# Patient Record
Sex: MALE | Race: WHITE | NOT HISPANIC OR LATINO | Employment: OTHER | URBAN - METROPOLITAN AREA
[De-identification: names, ages, dates, MRNs, and addresses within clinical notes are randomized per-mention and may not be internally consistent; named-entity substitution may affect disease eponyms.]

---

## 2017-03-01 DIAGNOSIS — Z12.11 ENCOUNTER FOR SCREENING COLONOSCOPY: Primary | ICD-10-CM

## 2017-07-06 RX ORDER — FINASTERIDE 5 MG/1
5 TABLET, FILM COATED ORAL DAILY
Qty: 90 TABLET | Refills: 1 | Status: SHIPPED | OUTPATIENT
Start: 2017-07-06 | End: 2017-11-27 | Stop reason: SDUPTHER

## 2017-07-31 ENCOUNTER — TELEPHONE (OUTPATIENT)
Dept: FAMILY MEDICINE CLINIC | Facility: CLINIC | Age: 72
End: 2017-07-31

## 2017-07-31 NOTE — TELEPHONE ENCOUNTER
----- Message from Dinah Pickard sent at 7/26/2017  8:22 AM EDT -----  PT KNOWS YOU ARE OUT OF OFFICE UNTIL Monday, JUST ASKED THAT I LET YOU KNOW IN THE MORNINGS HE CHECKS HIS BLOOD PRESSURE.  ITS USUALLY AROUND 91/51 WHEN HE FIRST GETS UP.  ABOUT 10 MINUTES LATER IT HAS RISEN /75.  HE WANTS TO KNOW IF THIS IS SOMETHING HE SHOULD COME IN TO DISCUSS WITH YOU.    PT'S NUMBER [phone]  A message was left that as long as he is not dizzy to 91/50 one does not bother me.  It could be coming from taking his tamsulosin at night.  Not sure I would make any adjustments in his medication but we may need to check his lab work if it persists.

## 2017-08-16 ENCOUNTER — OFFICE VISIT (OUTPATIENT)
Dept: FAMILY MEDICINE CLINIC | Facility: CLINIC | Age: 72
End: 2017-08-16

## 2017-08-16 VITALS
HEIGHT: 71 IN | TEMPERATURE: 98.5 F | HEART RATE: 75 BPM | WEIGHT: 232 LBS | OXYGEN SATURATION: 98 % | DIASTOLIC BLOOD PRESSURE: 76 MMHG | RESPIRATION RATE: 16 BRPM | BODY MASS INDEX: 32.48 KG/M2 | SYSTOLIC BLOOD PRESSURE: 128 MMHG

## 2017-08-16 DIAGNOSIS — E78.5 HYPERLIPIDEMIA, UNSPECIFIED HYPERLIPIDEMIA TYPE: ICD-10-CM

## 2017-08-16 DIAGNOSIS — N42.9 DISORDER OF PROSTATE: ICD-10-CM

## 2017-08-16 DIAGNOSIS — R61 NIGHT SWEATS: Primary | ICD-10-CM

## 2017-08-16 DIAGNOSIS — I10 ESSENTIAL HYPERTENSION: ICD-10-CM

## 2017-08-16 PROCEDURE — 99214 OFFICE O/P EST MOD 30 MIN: CPT | Performed by: INTERNAL MEDICINE

## 2017-08-16 NOTE — PROGRESS NOTES
Subjective chief complaint is night sweats  Brian Martines is a 72 y.o. male.     History of Present Illness   Brian is here today for complaints of night sweats.  These are not occurring every night.  Is been going on for approximately one half of the year.  He has no known exposure to tuberculosis and no past history of tuberculosis.  He does take tamsulosin and Proscar for benign prostatic hypertrophy.  He takes the Flomax at night.  Additionally he has hypertension.  He is on some triamterene/hydrochlorothiazide.  He is not having any significant unexplained weight loss.  He has been trying to diet and exercise more regularly.  His weight has decreased by 8 pounds since his last visit here.  He has not had a recent chest x-ray did have a CAT scan of the chest in 2014 that showed no evidence of adenopathy or abnormalities.  He did have a colonoscopy this year.  The following portions of the patient's history were reviewed and updated as appropriate: allergies, current medications, past medical history and problem list.    Review of Systems   Constitutional: Positive for activity change. Negative for appetite change, chills and fever.   Respiratory: Negative for chest tightness and shortness of breath.    Cardiovascular: Negative for chest pain.   Genitourinary: Negative for dysuria and hematuria.       Objective   Physical Exam   Eyes: No scleral icterus.   Neck: No thyromegaly present.   Cardiovascular: Normal rate, regular rhythm and normal heart sounds.  Exam reveals no gallop and no friction rub.    No murmur heard.  Pulmonary/Chest: Effort normal and breath sounds normal. No respiratory distress. He has no wheezes. He has no rales.   Abdominal: Soft. Bowel sounds are normal. He exhibits no distension. There is no tenderness. There is no rebound and no guarding.   Musculoskeletal: He exhibits no edema.   Lymphadenopathy:        Head (right side): No submental, no submandibular, no preauricular and no  occipital adenopathy present.        Head (left side): No submental, no submandibular, no preauricular and no occipital adenopathy present.     He has no cervical adenopathy.     He has no axillary adenopathy.        Right: No supraclavicular adenopathy present.        Left: No supraclavicular adenopathy present.   Nursing note and vitals reviewed.      Assessment/Plan   Brian was seen today for hypertension and night sweats.    Diagnoses and all orders for this visit:    Night sweats  -     CBC & Differential  -     Comprehensive Metabolic Panel  -     TSH+Free T4  -     QuantiFERON TB Gold  -     XR Chest PA & Lateral; Future  -     PSA    Essential hypertension  -     CBC & Differential  -     Comprehensive Metabolic Panel  -     TSH+Free T4    Hyperlipidemia, unspecified hyperlipidemia type  -     Lipid Panel    Disorder of prostate   -     PSA      Brian is here today for valuation of night sweats.  His exam is unremarkable.  I am going to check some lab work including a Quantiferon gold.  I suspect however that his night sweats are coming from his tamsulosin causing vasodilatation.

## 2017-08-19 LAB
ALBUMIN SERPL-MCNC: 4.5 G/DL (ref 3.5–4.8)
ALBUMIN/GLOB SERPL: 1.7 {RATIO} (ref 1.2–2.2)
ALP SERPL-CCNC: 63 IU/L (ref 39–117)
ALT SERPL-CCNC: 26 IU/L (ref 0–44)
ANNOTATION COMMENT IMP: NORMAL
AST SERPL-CCNC: 28 IU/L (ref 0–40)
BASOPHILS # BLD AUTO: 0 X10E3/UL (ref 0–0.2)
BASOPHILS NFR BLD AUTO: 0 %
BILIRUB SERPL-MCNC: 1.7 MG/DL (ref 0–1.2)
BUN SERPL-MCNC: 21 MG/DL (ref 8–27)
BUN/CREAT SERPL: 17 (ref 10–24)
CALCIUM SERPL-MCNC: 9.4 MG/DL (ref 8.6–10.2)
CHLORIDE SERPL-SCNC: 95 MMOL/L (ref 96–106)
CHOLEST SERPL-MCNC: 138 MG/DL (ref 100–199)
CO2 SERPL-SCNC: 24 MMOL/L (ref 18–29)
CREAT SERPL-MCNC: 1.25 MG/DL (ref 0.76–1.27)
EOSINOPHIL # BLD AUTO: 0.2 X10E3/UL (ref 0–0.4)
EOSINOPHIL NFR BLD AUTO: 2 %
ERYTHROCYTE [DISTWIDTH] IN BLOOD BY AUTOMATED COUNT: 13.7 % (ref 12.3–15.4)
GAMMA INTERFERON BACKGROUND BLD IA-ACNC: 0.03 IU/ML
GLOBULIN SER CALC-MCNC: 2.7 G/DL (ref 1.5–4.5)
GLUCOSE SERPL-MCNC: 93 MG/DL (ref 65–99)
HCT VFR BLD AUTO: 49.1 % (ref 37.5–51)
HDLC SERPL-MCNC: 39 MG/DL
HGB BLD-MCNC: 16.6 G/DL (ref 12.6–17.7)
IMM GRANULOCYTES # BLD: 0 X10E3/UL (ref 0–0.1)
IMM GRANULOCYTES NFR BLD: 0 %
INTERPRETATION: NORMAL
LDLC SERPL CALC-MCNC: 63 MG/DL (ref 0–99)
LYMPHOCYTES # BLD AUTO: 2.3 X10E3/UL (ref 0.7–3.1)
LYMPHOCYTES NFR BLD AUTO: 24 %
M TB IFN-G BLD-IMP: NEGATIVE
M TB IFN-G CD4+ BCKGRND COR BLD-ACNC: 0 IU/ML
M TB IFN-G CD4+ T-CELLS BLD-ACNC: 0.03 IU/ML
MCH RBC QN AUTO: 30.7 PG (ref 26.6–33)
MCHC RBC AUTO-ENTMCNC: 33.8 G/DL (ref 31.5–35.7)
MCV RBC AUTO: 91 FL (ref 79–97)
MITOGEN IGNF BLD-ACNC: >10 IU/ML
MONOCYTES # BLD AUTO: 0.9 X10E3/UL (ref 0.1–0.9)
MONOCYTES NFR BLD AUTO: 9 %
NEUTROPHILS # BLD AUTO: 6.1 X10E3/UL (ref 1.4–7)
NEUTROPHILS NFR BLD AUTO: 65 %
PLATELET # BLD AUTO: 205 X10E3/UL (ref 150–379)
POTASSIUM SERPL-SCNC: 3.4 MMOL/L (ref 3.5–5.2)
PROT SERPL-MCNC: 7.2 G/DL (ref 6–8.5)
PSA SERPL-MCNC: 0.2 NG/ML (ref 0–4)
QUANTIFERON INCUBATION: NORMAL
RBC # BLD AUTO: 5.4 X10E6/UL (ref 4.14–5.8)
SERVICE CMNT-IMP: NORMAL
SODIUM SERPL-SCNC: 140 MMOL/L (ref 134–144)
T4 FREE SERPL-MCNC: 1.15 NG/DL (ref 0.82–1.77)
TRIGL SERPL-MCNC: 181 MG/DL (ref 0–149)
TSH SERPL DL<=0.005 MIU/L-ACNC: 1.57 UIU/ML (ref 0.45–4.5)
VLDLC SERPL CALC-MCNC: 36 MG/DL (ref 5–40)
WBC # BLD AUTO: 9.5 X10E3/UL (ref 3.4–10.8)

## 2017-11-27 RX ORDER — FINASTERIDE 5 MG/1
TABLET, FILM COATED ORAL
Qty: 90 TABLET | Refills: 0 | Status: SHIPPED | OUTPATIENT
Start: 2017-11-27 | End: 2022-01-20

## 2018-05-25 ENCOUNTER — OFFICE VISIT (OUTPATIENT)
Dept: FAMILY MEDICINE CLINIC | Facility: CLINIC | Age: 73
End: 2018-05-25

## 2018-05-25 VITALS
BODY MASS INDEX: 33.74 KG/M2 | SYSTOLIC BLOOD PRESSURE: 140 MMHG | DIASTOLIC BLOOD PRESSURE: 86 MMHG | OXYGEN SATURATION: 99 % | WEIGHT: 241 LBS | HEIGHT: 71 IN | HEART RATE: 84 BPM | TEMPERATURE: 98.6 F

## 2018-05-25 DIAGNOSIS — I10 ESSENTIAL HYPERTENSION: ICD-10-CM

## 2018-05-25 DIAGNOSIS — E78.5 HYPERLIPIDEMIA, UNSPECIFIED HYPERLIPIDEMIA TYPE: ICD-10-CM

## 2018-05-25 DIAGNOSIS — M62.838 CERVICAL PARASPINAL MUSCLE SPASM: ICD-10-CM

## 2018-05-25 DIAGNOSIS — M54.50 ACUTE MIDLINE LOW BACK PAIN WITHOUT SCIATICA: Primary | ICD-10-CM

## 2018-05-25 PROCEDURE — 99214 OFFICE O/P EST MOD 30 MIN: CPT | Performed by: INTERNAL MEDICINE

## 2018-05-25 NOTE — PROGRESS NOTES
Subjective  Chief complaint is back pain  Brian Martines is a 73 y.o. male.     History of Present Illness   Brian is here today for evaluation of back pain.  He fell 3 days ago.  This was a fall off a chair.  He landed flush on his buttocks.  He felt pain in his lumbar region extending all way up to the neck.  Over the next several days he experienced some stiffening and pain.  He was seen at her physical therapy office yesterday.  He paid out of pocket for evaluation and did get some relief from physical therapy treatments.  The pain now is a 3 on a scale of 10.  There is no associated numbness or tingling in the legs.  His neck is somewhat stiff and sore as well.  Past medical history is remarkable for hypertension and hyperlipidemia.  He also has some benign prostatic hypertrophy.  He is due for some lab work to check on his cholesterol.  The following portions of the patient's history were reviewed and updated as appropriate: allergies, current medications, past medical history and problem list.    Review of Systems   Constitutional: Negative for chills and fever.   Genitourinary: Negative for hematuria.   Musculoskeletal: Positive for back pain and neck stiffness.   Skin: Negative for rash.   Neurological: Negative for weakness and numbness.       Objective   Physical Exam   Constitutional: He appears well-developed and well-nourished.   Cardiovascular: Normal rate.    Pulmonary/Chest: Effort normal.   Musculoskeletal:   He has limited lateral rotation of the neck approximately 60° in each direction.  He has limited extension of the neck.  He does not exhibit any focal spinal tenderness.  There is some paraspinous muscle tenderness primarily on the right hand side.  There is no sacroiliac tenderness.   Nursing note and vitals reviewed.        Assessment/Plan   Brian was seen today for back pain and fall.    Diagnoses and all orders for this visit:    Acute midline low back pain without sciatica  -      Ambulatory Referral to Physical Therapy Evaluate and treat, Ortho; Heat, Electrotherapy; Tens (Home); Stretching, Strengthening    Cervical paraspinal muscle spasm  -     Ambulatory Referral to Physical Therapy Evaluate and treat, Ortho; Heat, Electrotherapy; Tens (Home); Stretching, Strengthening    Hyperlipidemia, unspecified hyperlipidemia type  -     Comprehensive Metabolic Panel; Future  -     Lipid Panel; Future    Essential hypertension  -     Comprehensive Metabolic Panel; Future  -     Lipid Panel; Future      Outward is here today for evaluation of back pain.  I believe this is going to be mechanical back pain.  I do not suspect he has a compression fracture.  I am going to allow him to continue in some physical therapy for both this and his neck.  We are going to set him up for some fasting laboratories look his cholesterol.  If his back pain does not improve we will get x-rays at that time.

## 2018-05-30 ENCOUNTER — RESULTS ENCOUNTER (OUTPATIENT)
Dept: FAMILY MEDICINE CLINIC | Facility: CLINIC | Age: 73
End: 2018-05-30

## 2018-05-30 DIAGNOSIS — I10 ESSENTIAL HYPERTENSION: ICD-10-CM

## 2018-05-30 DIAGNOSIS — E78.5 HYPERLIPIDEMIA, UNSPECIFIED HYPERLIPIDEMIA TYPE: ICD-10-CM

## 2018-05-30 LAB
ALBUMIN SERPL-MCNC: 4.7 G/DL (ref 3.5–5.2)
ALBUMIN/GLOB SERPL: 1.7 G/DL
ALP SERPL-CCNC: 57 U/L (ref 39–117)
ALT SERPL-CCNC: 32 U/L (ref 1–41)
AST SERPL-CCNC: 31 U/L (ref 1–40)
BILIRUB SERPL-MCNC: 1.3 MG/DL (ref 0.1–1.2)
BUN SERPL-MCNC: 21 MG/DL (ref 8–23)
BUN/CREAT SERPL: 17.6 (ref 7–25)
CALCIUM SERPL-MCNC: 9.8 MG/DL (ref 8.6–10.5)
CHLORIDE SERPL-SCNC: 98 MMOL/L (ref 98–107)
CHOLEST SERPL-MCNC: 144 MG/DL (ref 0–200)
CO2 SERPL-SCNC: 27.1 MMOL/L (ref 22–29)
CREAT SERPL-MCNC: 1.19 MG/DL (ref 0.76–1.27)
GFR SERPLBLD CREATININE-BSD FMLA CKD-EPI: 60 ML/MIN/1.73
GFR SERPLBLD CREATININE-BSD FMLA CKD-EPI: 72 ML/MIN/1.73
GLOBULIN SER CALC-MCNC: 2.8 GM/DL
GLUCOSE SERPL-MCNC: 95 MG/DL (ref 65–99)
HDLC SERPL-MCNC: 39 MG/DL (ref 40–60)
LDLC SERPL CALC-MCNC: 73 MG/DL (ref 0–100)
POTASSIUM SERPL-SCNC: 3.6 MMOL/L (ref 3.5–5.2)
PROT SERPL-MCNC: 7.5 G/DL (ref 6–8.5)
SODIUM SERPL-SCNC: 142 MMOL/L (ref 136–145)
TRIGL SERPL-MCNC: 162 MG/DL (ref 0–150)
VLDLC SERPL CALC-MCNC: 32.4 MG/DL (ref 5–40)

## 2018-10-16 ENCOUNTER — FLU SHOT (OUTPATIENT)
Dept: FAMILY MEDICINE CLINIC | Facility: CLINIC | Age: 73
End: 2018-10-16

## 2018-10-16 ENCOUNTER — TELEPHONE (OUTPATIENT)
Dept: FAMILY MEDICINE CLINIC | Facility: CLINIC | Age: 73
End: 2018-10-16

## 2018-10-16 DIAGNOSIS — Z23 NEED FOR INFLUENZA VACCINATION: ICD-10-CM

## 2018-10-16 PROCEDURE — G0008 ADMIN INFLUENZA VIRUS VAC: HCPCS | Performed by: INTERNAL MEDICINE

## 2018-10-16 PROCEDURE — 90662 IIV NO PRSV INCREASED AG IM: CPT | Performed by: INTERNAL MEDICINE

## 2018-10-16 NOTE — TELEPHONE ENCOUNTER
Patient came inn for High dose flu and requested to check his BP. Rt arm 172/90.    Please call patient to discuss his BP.    After flu shot administered he asked I take his BP again. Rt arm 158/90.    Cell #[phone].  Thank you.    I talked with the patient regarding his blood pressure.  I did advise him to set up an office visit.  He did recently have a heart catheterization that was normal.

## 2018-10-19 ENCOUNTER — OFFICE VISIT (OUTPATIENT)
Dept: FAMILY MEDICINE CLINIC | Facility: CLINIC | Age: 73
End: 2018-10-19

## 2018-10-19 VITALS
BODY MASS INDEX: 33.46 KG/M2 | HEIGHT: 71 IN | SYSTOLIC BLOOD PRESSURE: 156 MMHG | DIASTOLIC BLOOD PRESSURE: 80 MMHG | TEMPERATURE: 98 F | HEART RATE: 68 BPM | RESPIRATION RATE: 16 BRPM | WEIGHT: 239 LBS

## 2018-10-19 DIAGNOSIS — I10 ESSENTIAL HYPERTENSION: ICD-10-CM

## 2018-10-19 DIAGNOSIS — I20.9 ANGINA PECTORIS WITHOUT MYOCARDIAL INFARCTION (HCC): Primary | ICD-10-CM

## 2018-10-19 PROCEDURE — 99213 OFFICE O/P EST LOW 20 MIN: CPT | Performed by: INTERNAL MEDICINE

## 2018-10-19 RX ORDER — ISOSORBIDE MONONITRATE 30 MG/1
30 TABLET, EXTENDED RELEASE ORAL DAILY
Qty: 30 TABLET | Refills: 5 | Status: SHIPPED | OUTPATIENT
Start: 2018-10-19 | End: 2018-11-01

## 2018-10-19 NOTE — PROGRESS NOTES
Subjective complaint is blood pressure also neck discomfort  Brian Martines is a 73 y.o. male.     History of Present Illness   Brian is here today for his blood pressure.  His blood pressure has been a little bit elevated when he has been taking it recently.  He previously was on some triamterene/hydrochlorothiazide.  He does note that since April of this year he has been getting some him for or pressure sensation in his shoulders and arms when he first starts walking.  Sometimes he gets some pressure sensation in the neck.  This usually occurs after 3-4 minutes into the walk but does get better after a period of time.  He recently saw his cardiologist.  He did have a heart catheterization.  He does have 3 vessel disease but it is not adequate critical point.  His previous stent in the LAD did have some restenosis but it was not critical.  The following portions of the patient's history were reviewed and updated as appropriate: allergies, current medications, past medical history and problem list.    Review of Systems   Respiratory: Negative for chest tightness and shortness of breath.    Cardiovascular: Negative for chest pain.   Neurological: Negative for dizziness, light-headedness and headache.       Objective   Physical Exam   Constitutional: He appears well-developed and well-nourished.   Cardiovascular: Normal rate, regular rhythm and normal heart sounds.    Pulmonary/Chest: Effort normal and breath sounds normal. No respiratory distress. He has no wheezes. He has no rales.   Musculoskeletal: He exhibits no edema.   Nursing note and vitals reviewed.        Assessment/Plan   Brian was seen today for hypertension.    Diagnoses and all orders for this visit:    Angina pectoris without myocardial infarction (CMS/HCC)    Essential hypertension    Other orders  -     metoprolol tartrate (LOPRESSOR) 25 MG tablet; Take 1 tablet by mouth 2 (Two) Times a Day.  -     isosorbide mononitrate (IMDUR) 30 MG 24 hr tablet;  Take 1 tablet by mouth Daily.     outward is here today for his blood pressure.  I suspect he has some degree of an angina equivalent.  I am going to have him stop his triamterene/hydrochlorothiazide.  I am going to prescribe some metoprolol 25 mg twice daily along with some isosorbide.  He is going to see me back in 2 weeks.

## 2018-10-22 ENCOUNTER — TELEPHONE (OUTPATIENT)
Dept: FAMILY MEDICINE CLINIC | Facility: CLINIC | Age: 73
End: 2018-10-22

## 2018-10-22 NOTE — TELEPHONE ENCOUNTER
----- Message from Dinah Pickard sent at 10/22/2018 12:26 PM EDT -----  PT BELIEVES HIS METOPROLOL IS CAUSING HIM TO HAVE HEADACHES AND WOULD LIKE TO TALK TO YOU REGARDING THIS.  PLEASE CALL [phone]    I advised him that it is likely the isosorbide causing the headaches and not the metoprolol.  I advised discontinuing the isosorbide and see what happens.

## 2018-11-01 ENCOUNTER — OFFICE VISIT (OUTPATIENT)
Dept: FAMILY MEDICINE CLINIC | Facility: CLINIC | Age: 73
End: 2018-11-01

## 2018-11-01 VITALS
TEMPERATURE: 98.3 F | WEIGHT: 235 LBS | BODY MASS INDEX: 32.9 KG/M2 | OXYGEN SATURATION: 98 % | HEIGHT: 71 IN | SYSTOLIC BLOOD PRESSURE: 132 MMHG | DIASTOLIC BLOOD PRESSURE: 84 MMHG | HEART RATE: 66 BPM

## 2018-11-01 DIAGNOSIS — I20.8 ANGINAL EQUIVALENT (HCC): ICD-10-CM

## 2018-11-01 DIAGNOSIS — I10 ESSENTIAL HYPERTENSION: Primary | ICD-10-CM

## 2018-11-01 PROCEDURE — 99213 OFFICE O/P EST LOW 20 MIN: CPT | Performed by: INTERNAL MEDICINE

## 2018-11-01 RX ORDER — TRIAMTERENE AND HYDROCHLOROTHIAZIDE 37.5; 25 MG/1; MG/1
1 TABLET ORAL DAILY
Qty: 90 TABLET | Refills: 1
Start: 2018-11-01 | End: 2022-01-20

## 2018-11-01 RX ORDER — ISOSORBIDE MONONITRATE 30 MG/1
15 TABLET, EXTENDED RELEASE ORAL DAILY
Qty: 30 TABLET | Refills: 5
Start: 2018-11-01 | End: 2018-12-06 | Stop reason: SDUPTHER

## 2018-11-01 NOTE — PROGRESS NOTES
Subjective and plain is follow-up for blood pressure and angina  Brian Martines is a 73 y.o. male.     History of Present Illness   Edhilda is here today for follow-up.  He has been experiencing some intermittent chest discomfort that radiates to his neck.  He did have a heart catheterization in July.  He had noncritical triple-vessel disease and his stent was open.  We did try month some metoprolol to see if it would help with angina and also help his blood pressure.  We also added some low-dose isosorbide.  He had a headache with the 30 mg of isosorbide.  His blood pressure home is been running higher than it seems to be here.  We did have him bring in his equipment to check today.  His blood pressure on his home cuff was 138/88.  My blood pressure was 130/76.  The following portions of the patient's history were reviewed and updated as appropriate: allergies, current medications, past medical history and problem list.    Review of Systems   Respiratory: Positive for chest tightness. Negative for shortness of breath.    Cardiovascular: Positive for chest pain.       Objective   Physical Exam   Constitutional: He appears well-developed and well-nourished.   Cardiovascular: Normal rate, regular rhythm and normal heart sounds.    Pulmonary/Chest: Effort normal.   Nursing note and vitals reviewed.        Assessment/Plan   Brian was seen today for follow-up.    Diagnoses and all orders for this visit:    Essential hypertension    Anginal equivalent (CMS/HCC)    Other orders  -     isosorbide mononitrate (IMDUR) 30 MG 24 hr tablet; Take 0.5 tablets by mouth Daily.  -     triamterene-hydrochlorothiazide (MAXZIDE-25) 37.5-25 MG per tablet; Take 1 tablet by mouth Daily.     Brian is here today for follow-up.  I'm going to have him go back to using his triamterene/hydrochlorothiazide.  I'm going to have him try using half of the isosorbide daily and see if that will help some with the angina symptoms and not causing a  headache.  He is going to see me back in 2 weeks.

## 2018-11-30 ENCOUNTER — OFFICE VISIT (OUTPATIENT)
Dept: FAMILY MEDICINE CLINIC | Facility: CLINIC | Age: 73
End: 2018-11-30

## 2018-11-30 VITALS
HEIGHT: 71 IN | OXYGEN SATURATION: 99 % | DIASTOLIC BLOOD PRESSURE: 80 MMHG | HEART RATE: 71 BPM | SYSTOLIC BLOOD PRESSURE: 130 MMHG | TEMPERATURE: 97.8 F | WEIGHT: 241 LBS | BODY MASS INDEX: 33.74 KG/M2

## 2018-11-30 DIAGNOSIS — R06.09 EXERTIONAL DYSPNEA: ICD-10-CM

## 2018-11-30 DIAGNOSIS — I10 ESSENTIAL HYPERTENSION: Primary | ICD-10-CM

## 2018-11-30 PROCEDURE — 99213 OFFICE O/P EST LOW 20 MIN: CPT | Performed by: INTERNAL MEDICINE

## 2018-11-30 NOTE — PROGRESS NOTES
Subjective complaint is follow-up on blood pressure and angina  Brian Martines is a 73 y.o. male.     History of Present Illness   Brian is here today for follow-up.  At his last visit we did have him go back to his trapped ring/hydrochlorothiazide to Cipro even out his blood pressures little bit.  He is still taking the metoprolol and is using half of the isosorbide.  He can tolerate the half dose of the isosorbide but it is not really helping the chest discomfort he is having.  He reports that when he goes hunting or walks on his property he gets pain in his upper chest.  His recent cardiac catheterization showed triple vessel disease but the maximum stenosis was 40%.  His echocardiogram showed a normal ejection fraction some diastolic dysfunction.  He had a chest x-ray performed in July which showed some perihilar atelectasis.  He has never been a smoker.  The following portions of the patient's history were reviewed and updated as appropriate: allergies, current medications, past medical history and problem list.    Review of Systems   Respiratory: Positive for chest tightness.    Cardiovascular: Positive for chest pain.       Objective   Physical Exam   Constitutional: He appears well-developed and well-nourished.   Cardiovascular: Normal rate, regular rhythm and normal heart sounds.   Pulmonary/Chest: Effort normal and breath sounds normal.   Nursing note and vitals reviewed.        Assessment/Plan   Brian was seen today for hypertension.    Diagnoses and all orders for this visit:    Essential hypertension    Exertional dyspnea  -     XR Chest PA & Lateral; Future  -     Full Pulmonary Function Test With Bronchodilator; Future     Brian is here today for follow-up.  His blood pressure seems to be doing okay.  I am concerned about his exertional dyspnea and discomfort in his chest.  I am going to get a chest x-ray to see if the perihilar atelectasis has resolved.  I'm also going to do some pulmonary function  tests.  I did advise him that he probably needs to get back to see his cardiologist.

## 2018-12-05 ENCOUNTER — HOSPITAL ENCOUNTER (OUTPATIENT)
Dept: RESPIRATORY THERAPY | Facility: HOSPITAL | Age: 73
Discharge: HOME | End: 2018-12-05
Attending: INTERNAL MEDICINE | Admitting: INTERNAL MEDICINE

## 2018-12-05 ENCOUNTER — HOSPITAL ENCOUNTER (OUTPATIENT)
Dept: GENERAL RADIOLOGY | Facility: HOSPITAL | Age: 73
Discharge: HOME | End: 2018-12-05
Attending: INTERNAL MEDICINE

## 2018-12-05 DIAGNOSIS — R06.09 EXERTIONAL DYSPNEA: ICD-10-CM

## 2018-12-05 PROCEDURE — 94060 EVALUATION OF WHEEZING: CPT

## 2018-12-05 PROCEDURE — 94640 AIRWAY INHALATION TREATMENT: CPT

## 2018-12-05 PROCEDURE — 71046 X-RAY EXAM CHEST 2 VIEWS: CPT

## 2018-12-05 PROCEDURE — 94726 PLETHYSMOGRAPHY LUNG VOLUMES: CPT

## 2018-12-05 PROCEDURE — 94729 DIFFUSING CAPACITY: CPT

## 2018-12-05 RX ORDER — ALBUTEROL SULFATE 2.5 MG/3ML
2.5 SOLUTION RESPIRATORY (INHALATION) ONCE
Status: COMPLETED | OUTPATIENT
Start: 2018-12-05 | End: 2018-12-05

## 2018-12-05 RX ADMIN — ALBUTEROL SULFATE 2.5 MG: 2.5 SOLUTION RESPIRATORY (INHALATION) at 09:28

## 2018-12-06 RX ORDER — ISOSORBIDE MONONITRATE 30 MG/1
15 TABLET, EXTENDED RELEASE ORAL DAILY
Qty: 30 TABLET | Refills: 5
Start: 2018-12-06 | End: 2022-01-20

## 2019-05-26 ENCOUNTER — DOCUMENTATION (OUTPATIENT)
Dept: FAMILY MEDICINE CLINIC | Facility: CLINIC | Age: 74
End: 2019-05-26

## 2019-05-26 RX ORDER — DOXYCYCLINE HYCLATE 100 MG/1
100 CAPSULE ORAL 2 TIMES DAILY
Qty: 20 CAPSULE | Refills: 0 | Status: SHIPPED | OUTPATIENT
Start: 2019-05-26 | End: 2019-06-05

## 2019-05-28 ENCOUNTER — TELEPHONE (OUTPATIENT)
Dept: FAMILY MEDICINE CLINIC | Facility: CLINIC | Age: 74
End: 2019-05-28

## 2019-05-28 NOTE — TELEPHONE ENCOUNTER
----- Message from Sanam Byrd sent at 5/28/2019  8:59 AM EDT -----  [phone] PATIENT   PHARM: [facility]      PATIENT SAID YOU HAVE TREATED HIM FOR THE TICK BITE. SAID HE IS IN PAIN, EVEN HIS HAIR HURTS. HE STILL HAS THE TICK IN A JAR. DO YOU WANT TO SEE HIM OR CALL IN SOMETHING     I advised office visit

## 2019-05-29 ENCOUNTER — OFFICE VISIT (OUTPATIENT)
Dept: FAMILY MEDICINE CLINIC | Facility: CLINIC | Age: 74
End: 2019-05-29

## 2019-05-29 VITALS
SYSTOLIC BLOOD PRESSURE: 110 MMHG | DIASTOLIC BLOOD PRESSURE: 70 MMHG | OXYGEN SATURATION: 98 % | BODY MASS INDEX: 31.22 KG/M2 | HEIGHT: 71 IN | TEMPERATURE: 98.3 F | HEART RATE: 79 BPM | WEIGHT: 223 LBS
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
## 2019-05-29 DIAGNOSIS — M79.10 MYALGIA: ICD-10-CM

## 2019-05-29 DIAGNOSIS — S30.860S: Primary | ICD-10-CM

## 2019-05-29 DIAGNOSIS — R50.9 FEVER, UNSPECIFIED FEVER CAUSE: ICD-10-CM

## 2019-05-29 PROBLEM — I24.9 ACS (ACUTE CORONARY SYNDROME) (HCC): Status: ACTIVE | Noted: 2018-07-05

## 2019-05-29 PROCEDURE — 99214 OFFICE O/P EST MOD 30 MIN: CPT | Performed by: INTERNAL MEDICINE

## 2019-05-29 NOTE — PROGRESS NOTES
Subjective Chief complaint is follow-up for tick bite  Brian Martines is a 74 y.o. male.     History of Present Illness   Brian is here today for follow-up after a tick bite.  The tick bite occurred on his buttock.  It occurred little over a week ago.  The tick it probably been on them for a few days when it was found.  He did not have any target-like lesion around the bite.  He has not had any other rashes.  He has had fever and sweats.  He is also having considerable aching.  He reports that even his hair follicles seem to ache.  Over the weekend we did phone in some doxycycline.  Past history is remarkable for coronary artery disease and hyperlipidemia.  He is on some atorvastatin.  The following portions of the patient's history were reviewed and updated as appropriate: allergies, current medications, past family history, past medical history, past social history, past surgical history and problem list.    Review of Systems   Constitutional: Positive for chills, diaphoresis and fever.   HENT: Negative for congestion, sinus pressure and sore throat.    Respiratory: Negative for cough and shortness of breath.    Gastrointestinal: Positive for nausea. Negative for diarrhea and vomiting.   Genitourinary: Negative for dysuria and hematuria.       Objective   Physical Exam   Constitutional: He appears well-developed. No distress.   HENT:   Tympanic membranes are normal.  There is mild nasal congestion but no significant erythema.  Oropharynx is clear.   Eyes: Conjunctivae are normal.   Cardiovascular: Normal rate, regular rhythm and normal heart sounds.   Pulmonary/Chest: Effort normal and breath sounds normal. No respiratory distress. He has no wheezes. He has no rales.   Abdominal: Soft. Bowel sounds are normal. There is no tenderness. There is no guarding.   Musculoskeletal: He exhibits no edema.   Lymphadenopathy:     He has no cervical adenopathy.   Skin: No rash noted.   Nursing note and vitals  reviewed.        Assessment/Plan   Brian was seen today for follow-up.    Diagnoses and all orders for this visit:    Tick bite of buttock, sequela  -     CBC & Differential  -     Human Gran. Ehrlichiosis (IgG)  -     Lyme, Total Antibody Test / Reflex  -     [facility] (IgG / M)    Myalgia  -     CBC & Differential  -     Sedimentation Rate  -     CK  -     Urinalysis With Microscopic - Urine, Clean Catch  -     Urine Culture - Urine, Urine, Clean Catch    Fever, unspecified fever cause  -     CBC & Differential  -     Urinalysis With Microscopic - Urine, Clean Catch  -     Urine Culture - Urine, Urine, Clean Catch      Brian is here today for follow-up after tick bite.  He is having some symptoms that would make me think he has a tickborne illness.  I am going to check some appropriate lab work.  For now we will have him continue the doxycycline that we phoned in over the weekend.

## 2019-06-03 LAB
A PHAGOCYTOPH IGG TITR SER IF: NEGATIVE {TITER}
APPEARANCE UR: CLEAR
B BURGDOR IGG+IGM SER-ACNC: <0.91 ISR (ref 0–0.9)
BACTERIA #/AREA URNS HPF: ABNORMAL /HPF
BACTERIA UR CULT: NO GROWTH
BACTERIA UR CULT: NORMAL
BASOPHILS # BLD AUTO: 0.01 10*3/MM3 (ref 0–0.2)
BASOPHILS # BLD MANUAL: 0 10*3/MM3 (ref 0–0.2)
BASOPHILS NFR BLD AUTO: 0.3 % (ref 0–1.5)
BASOPHILS NFR BLD MANUAL: 0 % (ref 0–1.5)
BILIRUB UR QL STRIP: NEGATIVE
CASTS URNS MICRO: ABNORMAL
CK SERPL-CCNC: 274 U/L (ref 20–200)
COLOR UR: (no result)
DIFFERENTIAL COMMENT: ABNORMAL
EOSINOPHIL # BLD AUTO: 0.03 10*3/MM3 (ref 0–0.4)
EOSINOPHIL # BLD MANUAL: 0 10*3/MM3 (ref 0–0.4)
EOSINOPHIL NFR BLD AUTO: 1 % (ref 0.3–6.2)
EOSINOPHIL NFR BLD MANUAL: 0 % (ref 0.3–6.2)
EPI CELLS #/AREA URNS HPF: ABNORMAL /HPF
ERYTHROCYTE [DISTWIDTH] IN BLOOD BY AUTOMATED COUNT: 13.2 % (ref 12.3–15.4)
ERYTHROCYTE [SEDIMENTATION RATE] IN BLOOD BY WESTERGREN METHOD: 1 MM/HR (ref 0–20)
GLUCOSE UR QL: NEGATIVE
HCT VFR BLD AUTO: 50.8 % (ref 37.5–51)
HGB BLD-MCNC: 16.7 G/DL (ref 13–17.7)
HGB UR QL STRIP: NEGATIVE
IMM GRANULOCYTES # BLD AUTO: 0.06 10*3/MM3 (ref 0–0.05)
IMM GRANULOCYTES NFR BLD AUTO: 2 % (ref 0–0.5)
KETONES UR QL STRIP: NEGATIVE
LEUKOCYTE ESTERASE UR QL STRIP: NEGATIVE
LYMPHOCYTES # BLD AUTO: 0.85 10*3/MM3 (ref 0.7–3.1)
LYMPHOCYTES # BLD MANUAL: 1.45 10*3/MM3 (ref 0.7–3.1)
LYMPHOCYTES NFR BLD AUTO: 28.1 % (ref 19.6–45.3)
LYMPHOCYTES NFR BLD MANUAL: 48 % (ref 19.6–45.3)
MCH RBC QN AUTO: 30.6 PG (ref 26.6–33)
MCHC RBC AUTO-ENTMCNC: 32.9 G/DL (ref 31.5–35.7)
MCV RBC AUTO: 93.2 FL (ref 79–97)
MONOCYTES # BLD AUTO: 0.58 10*3/MM3 (ref 0.1–0.9)
MONOCYTES # BLD MANUAL: 0.3 10*3/MM3 (ref 0.1–0.9)
MONOCYTES NFR BLD AUTO: 19.2 % (ref 5–12)
MONOCYTES NFR BLD MANUAL: 10 % (ref 5–12)
NEUTROPHILS # BLD AUTO: 1.49 10*3/MM3 (ref 1.7–7)
NEUTROPHILS # BLD MANUAL: 1.27 10*3/MM3 (ref 1.7–7)
NEUTROPHILS NFR BLD AUTO: 49.4 % (ref 42.7–76)
NEUTROPHILS NFR BLD MANUAL: 42 % (ref 42.7–76)
NITRITE UR QL STRIP: NEGATIVE
PH UR STRIP: 6 [PH] (ref 5–8)
PLATELET # BLD AUTO: 96 10*3/MM3 (ref 140–450)
PLATELET BLD QL SMEAR: ABNORMAL
PROT UR QL STRIP: (no result)
R RICKETTSI IGG SER QL IA: POSITIVE
R RICKETTSI IGG TITR SER IF: ABNORMAL {TITER}
R RICKETTSI IGM SER-ACNC: 0.12 INDEX (ref 0–0.89)
RBC # BLD AUTO: 5.45 10*6/MM3 (ref 4.14–5.8)
RBC #/AREA URNS HPF: ABNORMAL /HPF
RBC MORPH BLD: ABNORMAL
SP GR UR: (no result) (ref 1–1.03)
UROBILINOGEN UR STRIP-MCNC: (no result) MG/DL
WBC # BLD AUTO: 3.02 10*3/MM3 (ref 3.4–10.8)
WBC #/AREA URNS HPF: ABNORMAL /HPF

## 2019-07-31 ENCOUNTER — OFFICE VISIT (OUTPATIENT)
Dept: FAMILY MEDICINE CLINIC | Facility: CLINIC | Age: 74
End: 2019-07-31

## 2019-07-31 VITALS
DIASTOLIC BLOOD PRESSURE: 78 MMHG | BODY MASS INDEX: 31.5 KG/M2 | OXYGEN SATURATION: 98 % | HEART RATE: 71 BPM | SYSTOLIC BLOOD PRESSURE: 120 MMHG | TEMPERATURE: 98.2 F | WEIGHT: 225 LBS | HEIGHT: 71 IN

## 2019-07-31 DIAGNOSIS — M79.89 LEG SWELLING: ICD-10-CM

## 2019-07-31 DIAGNOSIS — M79.661 RIGHT CALF PAIN: ICD-10-CM

## 2019-07-31 DIAGNOSIS — M71.21 SYNOVIAL CYST OF RIGHT POPLITEAL SPACE: Primary | ICD-10-CM

## 2019-07-31 PROCEDURE — 99214 OFFICE O/P EST MOD 30 MIN: CPT | Performed by: INTERNAL MEDICINE

## 2019-07-31 RX ORDER — METHYLPREDNISOLONE 4 MG/1
TABLET ORAL
Qty: 21 TABLET | Refills: 0 | Status: SHIPPED | OUTPATIENT
Start: 2019-07-31 | End: 2019-07-31 | Stop reason: SDUPTHER

## 2019-07-31 RX ORDER — METHYLPREDNISOLONE 4 MG/1
TABLET ORAL
Qty: 21 TABLET | Refills: 0 | Status: SHIPPED | OUTPATIENT
Start: 2019-07-31 | End: 2022-01-20

## 2019-07-31 NOTE — PROGRESS NOTES
Subjective Chief complaint is pain and swelling behind his right knee  Brian Martines is a 74 y.o. male.     History of Present Illness   Brian is here today for complaints of moderate pain and swelling behind his right knee beginning a few days ago.  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  He felt some discomfort behind his knee.  It is difficult to bend the knee.  He also noticed some discoloration of the skin in this area.  He is concerned about blood clots.  He is not having fever or chills.  The calf itself is not really swelling.  Past medical history is remarkable for hyperlipidemia and hypertension.  He does not have any diabetes that would prevent him from taking steroids.  The following portions of the patient's history were reviewed and updated as appropriate: allergies, current medications, past family history, past medical history, past social history, past surgical history and problem list.    Review of Systems   Constitutional: Negative for chills and fever.   Respiratory: Negative for shortness of breath.    Cardiovascular: Negative for chest pain.   Musculoskeletal: Positive for joint swelling.   Skin: Positive for color change.       Objective   Physical Exam   Constitutional: He appears well-developed and well-nourished.   Cardiovascular: Normal rate.   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   Pulmonary/Chest: Effort normal.   Musculoskeletal: He exhibits no edema.   There is a fairly sizable Baker's cyst or popliteal cyst in the right popliteal fossa.  The calf on the right itself is not tender.   Nursing note and vitals reviewed.        Assessment/Plan   Brian was seen today for pain.    Diagnoses and all orders for this visit:    Synovial cyst of right popliteal space  -     Duplex Venous Lower Extremity - Right CAR; Future    Leg swelling  -     Duplex Venous Lower Extremity - Right CAR; Future    Right calf pain  -     Duplex Venous  Lower Extremity - Right CAR; Future    Other orders  -     Discontinue: methylPREDNISolone (MEDROL, SAMINA,) 4 MG tablet; Take as directed on package instructions.  -     methylPREDNISolone (MEDROL, SAMINA,) 4 MG tablet; Take as directed on package instructions.    Brian is here today for some leg pain and swelling.  I suspect this is going to be due to a Baker's cyst.  I am going to get a Doppler study on his leg.  We are going to treat him with a Medrol Dosepak and see if we can reduce some of the fluid in the knee.  He may end up needing an orthopedic referral.

## 2019-08-08 ENCOUNTER — HOSPITAL ENCOUNTER (OUTPATIENT)
Dept: CARDIOLOGY | Facility: HOSPITAL | Age: 74
Discharge: HOME | End: 2019-08-08
Admitting: INTERNAL MEDICINE

## 2019-08-08 DIAGNOSIS — M79.89 LEG SWELLING: ICD-10-CM

## 2019-08-08 DIAGNOSIS — M71.21 SYNOVIAL CYST OF RIGHT POPLITEAL SPACE: ICD-10-CM

## 2019-08-08 DIAGNOSIS — M79.661 RIGHT CALF PAIN: ICD-10-CM

## 2019-08-08 LAB
BH CV LOW VAS RIGHT POPLITEAL SPONT: 1
BH CV LOWER VASCULAR LEFT COMMON FEMORAL AUGMENT: NORMAL
BH CV LOWER VASCULAR LEFT COMMON FEMORAL COMPETENT: NORMAL
BH CV LOWER VASCULAR LEFT COMMON FEMORAL COMPRESS: NORMAL
BH CV LOWER VASCULAR LEFT COMMON FEMORAL PHASIC: NORMAL
BH CV LOWER VASCULAR LEFT COMMON FEMORAL SPONT: NORMAL
BH CV LOWER VASCULAR RIGHT COMMON FEMORAL AUGMENT: NORMAL
BH CV LOWER VASCULAR RIGHT COMMON FEMORAL COMPETENT: NORMAL
BH CV LOWER VASCULAR RIGHT COMMON FEMORAL COMPRESS: NORMAL
BH CV LOWER VASCULAR RIGHT COMMON FEMORAL PHASIC: NORMAL
BH CV LOWER VASCULAR RIGHT COMMON FEMORAL SPONT: NORMAL
BH CV LOWER VASCULAR RIGHT DISTAL FEMORAL COMPRESS: NORMAL
BH CV LOWER VASCULAR RIGHT GASTRONEMIUS COMPRESS: NORMAL
BH CV LOWER VASCULAR RIGHT GREATER SAPH AK COMPRESS: NORMAL
BH CV LOWER VASCULAR RIGHT GREATER SAPH BK COMPRESS: NORMAL
BH CV LOWER VASCULAR RIGHT MID FEMORAL AUGMENT: NORMAL
BH CV LOWER VASCULAR RIGHT MID FEMORAL COMPETENT: NORMAL
BH CV LOWER VASCULAR RIGHT MID FEMORAL COMPRESS: NORMAL
BH CV LOWER VASCULAR RIGHT MID FEMORAL PHASIC: NORMAL
BH CV LOWER VASCULAR RIGHT MID FEMORAL SPONT: NORMAL
BH CV LOWER VASCULAR RIGHT PERONEAL COMPRESS: NORMAL
BH CV LOWER VASCULAR RIGHT POPLITEAL AUGMENT: NORMAL
BH CV LOWER VASCULAR RIGHT POPLITEAL COMPETENT: NORMAL
BH CV LOWER VASCULAR RIGHT POPLITEAL COMPRESS: NORMAL
BH CV LOWER VASCULAR RIGHT POPLITEAL PHASIC: NORMAL
BH CV LOWER VASCULAR RIGHT POPLITEAL SPONT: NORMAL
BH CV LOWER VASCULAR RIGHT POSTERIOR TIBIAL COMPRESS: NORMAL
BH CV LOWER VASCULAR RIGHT PROXIMAL FEMORAL COMPRESS: NORMAL
BH CV LOWER VASCULAR RIGHT SAPHENOFEMORAL JUNCTION COMPRESS: NORMAL
BH CV VAS POP FLUID COLLECTED: 1

## 2019-08-08 PROCEDURE — 93971 EXTREMITY STUDY: CPT

## 2019-11-27 ENCOUNTER — TELEPHONE (OUTPATIENT)
Dept: FAMILY MEDICINE CLINIC | Facility: CLINIC | Age: 74
End: 2019-11-27

## 2019-11-27 NOTE — TELEPHONE ENCOUNTER
----- Message from Dinah Pickard sent at 11/27/2019  9:11 AM EST -----  PT WOULD LIKE TO SPEAK TO YOU, SAYS HE HAD TROUBLE REMEMBER THINGS YESTERDAY AND JUST WANTED TO DISCUSS IT WITH YOU.    [phone]     I advised an office visit for her neurologic exam and possible memory testing.  If symptoms occur again he may need to go to the emergency room

## 2020-04-14 ENCOUNTER — TELEPHONE (OUTPATIENT)
Dept: FAMILY MEDICINE CLINIC | Facility: CLINIC | Age: 75
End: 2020-04-14

## 2020-04-15 NOTE — TELEPHONE ENCOUNTER
Called with aches, temp 99, nausea,  No soa, cough.sore throat.  Recommended fluids rest, sx tx.  Call back if high fever, soa, cough, distress.

## 2022-01-06 ENCOUNTER — TELEPHONE (OUTPATIENT)
Dept: FAMILY MEDICINE CLINIC | Facility: CLINIC | Age: 77
End: 2022-01-06

## 2022-01-06 NOTE — TELEPHONE ENCOUNTER
Caller: Brian Martines    Relationship: Self    Best call back number: [phone]     What medication are you requesting:     SOMETHING FOR A COUGH THAT IS IN THE BRONCHIAL TUBES.      If a prescription is needed, what is your preferred pharmacy and phone number:      [facility] - [place], [place] - [address] AT [facility] [address] - [phone]  - [phone] FX        Additional notes:      PLEASE CALL PATIENT AND ADVISE.    The patient reports a cough.  He does not have any shortness of breath.  He is not having any fever.  He does not have any abdominal symptoms.  He did do a home test that was negative.  I did advise him to try some Mucinex DM.

## 2022-01-20 ENCOUNTER — PRE-ADMISSION TESTING (OUTPATIENT)
Dept: PREADMISSION TESTING | Facility: HOSPITAL | Age: 77
End: 2022-01-20

## 2022-01-20 VITALS
WEIGHT: 242 LBS | OXYGEN SATURATION: 99 % | BODY MASS INDEX: 33.88 KG/M2 | HEART RATE: 73 BPM | SYSTOLIC BLOOD PRESSURE: 151 MMHG | DIASTOLIC BLOOD PRESSURE: 90 MMHG | RESPIRATION RATE: 20 BRPM | TEMPERATURE: 97.8 F | HEIGHT: 71 IN

## 2022-01-20 LAB
ANION GAP SERPL CALCULATED.3IONS-SCNC: 7.6 MMOL/L (ref 5–15)
BUN SERPL-MCNC: 18 MG/DL (ref 8–23)
BUN/CREAT SERPL: 18.9 (ref 7–25)
CALCIUM SPEC-SCNC: 9.3 MG/DL (ref 8.6–10.5)
CHLORIDE SERPL-SCNC: 102 MMOL/L (ref 98–107)
CO2 SERPL-SCNC: 29.4 MMOL/L (ref 22–29)
CREAT SERPL-MCNC: 0.95 MG/DL (ref 0.76–1.27)
DEPRECATED RDW RBC AUTO: 43.7 FL (ref 37–54)
ERYTHROCYTE [DISTWIDTH] IN BLOOD BY AUTOMATED COUNT: 12.9 % (ref 12.3–15.4)
GFR SERPL CREATININE-BSD FRML MDRD: 77 ML/MIN/1.73
GLUCOSE SERPL-MCNC: 101 MG/DL (ref 65–99)
HCT VFR BLD AUTO: 46.9 % (ref 37.5–51)
HGB BLD-MCNC: 15.7 G/DL (ref 13–17.7)
MCH RBC QN AUTO: 30.6 PG (ref 26.6–33)
MCHC RBC AUTO-ENTMCNC: 33.5 G/DL (ref 31.5–35.7)
MCV RBC AUTO: 91.4 FL (ref 79–97)
PLATELET # BLD AUTO: 246 10*3/MM3 (ref 140–450)
PMV BLD AUTO: 9.5 FL (ref 6–12)
POTASSIUM SERPL-SCNC: 3.8 MMOL/L (ref 3.5–5.2)
RBC # BLD AUTO: 5.13 10*6/MM3 (ref 4.14–5.8)
SODIUM SERPL-SCNC: 139 MMOL/L (ref 136–145)
WBC NRBC COR # BLD: 10.37 10*3/MM3 (ref 3.4–10.8)

## 2022-01-20 PROCEDURE — 36415 COLL VENOUS BLD VENIPUNCTURE: CPT

## 2022-01-20 PROCEDURE — 85027 COMPLETE CBC AUTOMATED: CPT

## 2022-01-20 PROCEDURE — 80048 BASIC METABOLIC PNL TOTAL CA: CPT

## 2022-01-20 RX ORDER — LOSARTAN POTASSIUM 50 MG/1
50 TABLET ORAL DAILY
COMMUNITY

## 2022-01-20 RX ORDER — AMLODIPINE BESYLATE 10 MG/1
10 TABLET ORAL DAILY
COMMUNITY

## 2022-01-20 RX ORDER — ASPIRIN 81 MG/1
81 TABLET ORAL DAILY
COMMUNITY

## 2022-01-20 NOTE — DISCHARGE INSTRUCTIONS
Take the following medications the morning of surgery:  AMLODIPINE    If you are on prescription narcotic pain medication to control your pain you may also take that medication the morning of surgery.    General Instructions:  • Do not eat solid food after midnight the night before surgery.  • You may drink clear liquids day of surgery but must stop at least one hour before your hospital arrival time.  • It is beneficial for you to have a clear drink that contains carbohydrates the day of surgery.  We suggest a 12 to 20 ounce bottle of Gatorade or Powerade for non-diabetic patients or a 12 to 20 ounce bottle of G2 or Powerade Zero for diabetic patients. (Pediatric patients, are not advised to drink a 12 to 20 ounce carbohydrate drink)    Clear liquids are liquids you can see through.  Nothing red in color.     Plain water                               Sports drinks  Sodas                                   Gelatin (Jell-O)  Fruit juices without pulp such as white grape juice and apple juice  Popsicles that contain no fruit or yogurt  Tea or coffee (no cream or milk added)  Gatorade / Powerade  G2 / Powerade Zero    • Infants may have breast milk up to four hours before surgery.  • Infants drinking formula may drink formula up to six hours before surgery.   • Patients who avoid smoking, chewing tobacco and alcohol for 4 weeks prior to surgery have a reduced risk of post-operative complications.  Quit smoking as many days before surgery as you can.  • Do not smoke, use chewing tobacco or drink alcohol the day of surgery.   • If applicable bring your C-PAP/ BI-PAP machine.  • Bring any papers given to you in the doctor’s office.  • Wear clean comfortable clothes.  • Do not wear contact lenses, false eyelashes or make-up.  Bring a case for your glasses.   • Bring crutches or walker if applicable.  • Remove all piercings.  Leave jewelry and any other valuables at home.  • Hair extensions with metal clips must be removed  prior to surgery.  • The Pre-Admission Testing nurse will instruct you to bring medications if unable to obtain an accurate list in Pre-Admission Testing.        If you were given a blood bank ID arm band remember to bring it with you the day of surgery.    Preventing a Surgical Site Infection:  • For 2 to 3 days before surgery, avoid shaving with a razor because the razor can irritate skin and make it easier to develop an infection.    • Any areas of open skin can increase the risk of a post-operative wound infection by allowing bacteria to enter and travel throughout the body.  Notify your surgeon if you have any skin wounds / rashes even if it is not near the expected surgical site.  The area will need assessed to determine if surgery should be delayed until it is healed.  • The night prior to surgery shower using a fresh bar of anti-bacterial soap (such as Dial) and clean washcloth.  Sleep in a clean bed with clean clothing.  Do not allow pets to sleep with you.  • Shower on the morning of surgery using a fresh bar of anti-bacterial soap (such as Dial) and clean washcloth.  Dry with a clean towel and dress in clean clothing.  • Ask your surgeon if you will be receiving antibiotics prior to surgery.  • Make sure you, your family, and all healthcare providers clean their hands with soap and water or an alcohol based hand  before caring for you or your wound.    Day of surgery: 2/3/2022 PT WILL BE NOTIFIED OF ARRIVALTIME  Your arrival time is approximately two hours before your scheduled surgery time.  Upon arrival, a Pre-op nurse and Anesthesiologist will review your health history, obtain vital signs, and answer questions you may have.  The only belongings needed at this time will be a list of your home medications and if applicable your C-PAP/BI-PAP machine.  A Pre-op nurse will start an IV and you may receive medication in preparation for surgery, including something to help you relax.     Please be  aware that surgery does come with discomfort.  We want to make every effort to control your discomfort so please discuss any uncontrolled symptoms with your nurse.   Your doctor will most likely have prescribed pain medications.      If you are going home after surgery you will receive individualized written care instructions before being discharged.  A responsible adult must drive you to and from the hospital on the day of your surgery and stay with you for 24 hours.  Discharge prescriptions can be filled by the hospital pharmacy during regular pharmacy hours.  If you are having surgery late in the day/evening your prescription may be e-prescribed to your pharmacy.  Please verify your pharmacy hours or chose a 24 hour pharmacy to avoid not having access to your prescription because your pharmacy has closed for the day.    If you are staying overnight following surgery, you will be transported to your hospital room following the recovery period.  [facility] has all private rooms.    If you have any questions please call Pre-Admission Testing at [phone].  Deductibles and co-payments are collected on the day of service. Please be prepared to pay the required co-pay, deductible or deposit on the day of service as defined by your plan.    Patient Education for Self-Quarantine Process    • Following your COVID testing, we strongly recommend that you wear a mask when you are with other people and practice social distancing.   • Limit your activities to only required outings.  • Wash your hands with soap and water frequently for at least 20 seconds.   • Avoid touching your eyes, nose and mouth with unwashed hands.  • Do not share anything - utensils, drinking glasses, food from the same bowl.   • Sanitize household surfaces daily. Include all high touch areas (door handles, light switches, phones, countertops, etc.)    Call your surgeon immediately if you experience any of the following  symptoms:  • Sore Throat  • Shortness of Breath or difficulty breathing  • Cough  • Chills  • Body soreness or muscle pain  • Headache  • Fever  • New loss of taste or smell  • Do not arrive for your surgery ill.  Your procedure will need to be rescheduled to another time.  You will need to call your physician before the day of surgery to avoid any unnecessary exposure to hospital staff as well as other patients.

## 2022-02-01 ENCOUNTER — APPOINTMENT (OUTPATIENT)
Dept: LAB | Facility: HOSPITAL | Age: 77
End: 2022-02-01

## 2022-02-01 ENCOUNTER — LAB (OUTPATIENT)
Dept: LAB | Facility: HOSPITAL | Age: 77
End: 2022-02-01

## 2022-02-01 LAB — SARS-COV-2 ORF1AB RESP QL NAA+PROBE: DETECTED

## 2022-02-01 PROCEDURE — U0004 COV-19 TEST NON-CDC HGH THRU: HCPCS

## 2022-02-01 PROCEDURE — C9803 HOPD COVID-19 SPEC COLLECT: HCPCS

## 2022-02-01 PROCEDURE — U0005 INFEC AGEN DETEC AMPLI PROBE: HCPCS

## 2022-02-09 ENCOUNTER — TRANSCRIBE ORDERS (OUTPATIENT)
Dept: ADMINISTRATIVE | Facility: HOSPITAL | Age: 77
End: 2022-02-09

## 2022-02-09 DIAGNOSIS — Z01.818 OTHER SPECIFIED PRE-OPERATIVE EXAMINATION: Primary | ICD-10-CM

## 2022-02-15 ENCOUNTER — LAB (OUTPATIENT)
Dept: LAB | Facility: HOSPITAL | Age: 77
End: 2022-02-15

## 2022-02-15 DIAGNOSIS — Z01.818 OTHER SPECIFIED PRE-OPERATIVE EXAMINATION: ICD-10-CM

## 2022-02-15 PROCEDURE — U0005 INFEC AGEN DETEC AMPLI PROBE: HCPCS

## 2022-02-15 PROCEDURE — U0004 COV-19 TEST NON-CDC HGH THRU: HCPCS

## 2022-02-15 PROCEDURE — C9803 HOPD COVID-19 SPEC COLLECT: HCPCS

## 2022-02-15 RX ORDER — CEFAZOLIN SODIUM 2 G/100ML
2 INJECTION, SOLUTION INTRAVENOUS ONCE
Status: COMPLETED | OUTPATIENT
Start: 2022-02-16 | End: 2022-02-16

## 2022-02-15 NOTE — H&P
HPI  Chief complaint right shoulder pain.  This patient is here to review MRI results for his right shoulder.  Briefly this is a 76-year-old  male presents with right shoulder pain when lifting and reaching with his arm.  It does not specifically hurt with pulling his bow back but he generally has pain in the superior and anterior aspect of the shoulder referring down the arm towards the elbow.  It is worse with activity.  He has had a previous injection a couple years ago that relieves symptoms and he also has a history of having a decompression of the right shoulder in 1991 successfully.  He recently had an MRI cervical spine that just showed some minimal stenosis at C3-4 but was otherwise unremarkable.  Social history was automatically updated to reflect changes to the patient's age and marital status.      Allergies, medications, past medical history, surgical history, family history, social history, and ROS were reviewed and unchanged (11/17/2021).    Referring Dr. NICHELLE ULRICH MD  PCP Dr. MARQUESS HARGROVE, NICHELLE FLORES    Physical Exam  Height:  71 in.    Weight:  226 lbs.     BMI:  31.63    Mental/HEENT/Cardio/Skin  The patient's general appearance is well nourished, well hydrated, no acute distress.  Orientation is alert and oriented x 3.  The patient's mood is normal.  Pulmonary exam shows normal air exchange, no labored breathing, or shortness of breath.  A skin exam shows normal temperature and color in the area of examination.  A lymphatic exam reveals no adenopathy in the area of examination.      Right Shoulder  Skin is normal.  There is no atrophy.  There is no effusion.  There is no warmth.  No erythema.  Lymphadenopathy is negative.  Right shoulder active ROM today shows: Elevation = 160; ER(side) = 60; ER(abd) = 90; IR(abd) = 60; IR(vert) = T12; Abduction = 150.  Shoulder strength: Elevation = 3/5; ER = 3/5; IR = 5/5; ABD = 5/5.  There is tenderness in the anterior lateral acromion.  Rosa Elena  test is positive.  Pederson test is positive.  Empty can test was positive.  Madrid's test is negative.  Pulses are normal.  Normal sensation.  The c-spine has normal motion.  Spurling right is negative.  Spurling left is negative.  Adson's test is negative.  Capillary refill is normal.  NV intact distally.        Imaging/Diagnostic Studies  MRI of his right shoulder from 11/4/2021 indicates a 26 mm near full-thickness tear supraspinatus tendon but no complete tear seen and no retraction or atrophy noted.  Impression  Right rotator cuff tendonitis ([id number])  Right partial chronic rotator cuff tear ([id number])    Plan  We discussed both operative options.  He would like to proceed with operative intervention with the right shoulder arthroscopy with rotator cuff debridement versus repair.  We discussed risk benefits and alternatives as well as postoperative recovery.  He would like to proceed.

## 2022-02-16 ENCOUNTER — ANESTHESIA (OUTPATIENT)
Dept: PERIOP | Facility: HOSPITAL | Age: 77
End: 2022-02-16

## 2022-02-16 ENCOUNTER — ANESTHESIA EVENT (OUTPATIENT)
Dept: PERIOP | Facility: HOSPITAL | Age: 77
End: 2022-02-16

## 2022-02-16 ENCOUNTER — HOSPITAL ENCOUNTER (OUTPATIENT)
Facility: HOSPITAL | Age: 77
Setting detail: HOSPITAL OUTPATIENT SURGERY
Discharge: HOME | End: 2022-02-16
Attending: ORTHOPAEDIC SURGERY | Admitting: ORTHOPAEDIC SURGERY

## 2022-02-16 VITALS
SYSTOLIC BLOOD PRESSURE: 148 MMHG | OXYGEN SATURATION: 96 % | DIASTOLIC BLOOD PRESSURE: 67 MMHG | HEART RATE: 76 BPM | RESPIRATION RATE: 16 BRPM | TEMPERATURE: 97.4 F

## 2022-02-16 DIAGNOSIS — Z98.890 S/P SHOULDER SURGERY: Primary | ICD-10-CM

## 2022-02-16 LAB — SARS-COV-2 ORF1AB RESP QL NAA+PROBE: DETECTED

## 2022-02-16 PROCEDURE — 25010000002 PHENYLEPHRINE 10 MG/ML SOLUTION: Performed by: ANESTHESIOLOGY

## 2022-02-16 PROCEDURE — 25010000002 PROPOFOL 10 MG/ML EMULSION: Performed by: ANESTHESIOLOGY

## 2022-02-16 PROCEDURE — 25010000002 ROPIVACAINE PER 1 MG: Performed by: ANESTHESIOLOGY

## 2022-02-16 PROCEDURE — 25010000002 EPINEPHRINE PER 0.1 MG: Performed by: ORTHOPAEDIC SURGERY

## 2022-02-16 PROCEDURE — 25010000002 NEOSTIGMINE 5 MG/10ML SOLUTION: Performed by: ANESTHESIOLOGY

## 2022-02-16 PROCEDURE — 0 CEFAZOLIN IN DEXTROSE 2-4 GM/100ML-% SOLUTION: Performed by: ORTHOPAEDIC SURGERY

## 2022-02-16 PROCEDURE — 25010000002 FENTANYL CITRATE (PF) 50 MCG/ML SOLUTION: Performed by: ANESTHESIOLOGY

## 2022-02-16 PROCEDURE — 76942 ECHO GUIDE FOR BIOPSY: CPT | Performed by: ORTHOPAEDIC SURGERY

## 2022-02-16 PROCEDURE — 25010000002 ONDANSETRON PER 1 MG: Performed by: ANESTHESIOLOGY

## 2022-02-16 RX ORDER — ROPIVACAINE HYDROCHLORIDE 5 MG/ML
INJECTION, SOLUTION EPIDURAL; INFILTRATION; PERINEURAL
Status: COMPLETED | OUTPATIENT
Start: 2022-02-16 | End: 2022-02-16

## 2022-02-16 RX ORDER — LIDOCAINE HYDROCHLORIDE 10 MG/ML
0.5 INJECTION, SOLUTION EPIDURAL; INFILTRATION; INTRACAUDAL; PERINEURAL ONCE AS NEEDED
Status: DISCONTINUED | OUTPATIENT
Start: 2022-02-16 | End: 2022-02-16 | Stop reason: HOSPADM

## 2022-02-16 RX ORDER — PROPOFOL 10 MG/ML
VIAL (ML) INTRAVENOUS AS NEEDED
Status: DISCONTINUED | OUTPATIENT
Start: 2022-02-16 | End: 2022-02-16 | Stop reason: SURG

## 2022-02-16 RX ORDER — SODIUM CHLORIDE 0.9 % (FLUSH) 0.9 %
10 SYRINGE (ML) INJECTION AS NEEDED
Status: DISCONTINUED | OUTPATIENT
Start: 2022-02-16 | End: 2022-02-16 | Stop reason: HOSPADM

## 2022-02-16 RX ORDER — SODIUM CHLORIDE 0.9 % (FLUSH) 0.9 %
10 SYRINGE (ML) INJECTION EVERY 12 HOURS SCHEDULED
Status: DISCONTINUED | OUTPATIENT
Start: 2022-02-16 | End: 2022-02-16 | Stop reason: HOSPADM

## 2022-02-16 RX ORDER — FENTANYL CITRATE 50 UG/ML
50 INJECTION, SOLUTION INTRAMUSCULAR; INTRAVENOUS
Status: DISCONTINUED | OUTPATIENT
Start: 2022-02-16 | End: 2022-02-16 | Stop reason: HOSPADM

## 2022-02-16 RX ORDER — ONDANSETRON 2 MG/ML
INJECTION INTRAMUSCULAR; INTRAVENOUS AS NEEDED
Status: DISCONTINUED | OUTPATIENT
Start: 2022-02-16 | End: 2022-02-16 | Stop reason: SURG

## 2022-02-16 RX ORDER — LIDOCAINE HYDROCHLORIDE 20 MG/ML
INJECTION, SOLUTION INFILTRATION; PERINEURAL AS NEEDED
Status: DISCONTINUED | OUTPATIENT
Start: 2022-02-16 | End: 2022-02-16 | Stop reason: SURG

## 2022-02-16 RX ORDER — NEOSTIGMINE METHYLSULFATE 0.5 MG/ML
INJECTION, SOLUTION INTRAVENOUS AS NEEDED
Status: DISCONTINUED | OUTPATIENT
Start: 2022-02-16 | End: 2022-02-16 | Stop reason: SURG

## 2022-02-16 RX ORDER — GLYCOPYRROLATE 0.2 MG/ML
INJECTION INTRAMUSCULAR; INTRAVENOUS AS NEEDED
Status: DISCONTINUED | OUTPATIENT
Start: 2022-02-16 | End: 2022-02-16 | Stop reason: SURG

## 2022-02-16 RX ORDER — PHENYLEPHRINE HYDROCHLORIDE 10 MG/ML
INJECTION INTRAVENOUS AS NEEDED
Status: DISCONTINUED | OUTPATIENT
Start: 2022-02-16 | End: 2022-02-16 | Stop reason: SURG

## 2022-02-16 RX ORDER — SODIUM CHLORIDE, SODIUM LACTATE, POTASSIUM CHLORIDE, CALCIUM CHLORIDE 600; 310; 30; 20 MG/100ML; MG/100ML; MG/100ML; MG/100ML
9 INJECTION, SOLUTION INTRAVENOUS CONTINUOUS PRN
Status: DISCONTINUED | OUTPATIENT
Start: 2022-02-16 | End: 2022-02-16 | Stop reason: HOSPADM

## 2022-02-16 RX ORDER — ROCURONIUM BROMIDE 10 MG/ML
INJECTION, SOLUTION INTRAVENOUS AS NEEDED
Status: DISCONTINUED | OUTPATIENT
Start: 2022-02-16 | End: 2022-02-16 | Stop reason: SURG

## 2022-02-16 RX ORDER — ACETAMINOPHEN 500 MG
1000 TABLET ORAL EVERY 6 HOURS PRN
COMMUNITY

## 2022-02-16 RX ORDER — OXYCODONE AND ACETAMINOPHEN 7.5; 325 MG/1; MG/1
1 TABLET ORAL EVERY 4 HOURS PRN
Qty: 30 TABLET | Refills: 0 | Status: SHIPPED | OUTPATIENT
Start: 2022-02-16

## 2022-02-16 RX ORDER — FENTANYL CITRATE 50 UG/ML
INJECTION, SOLUTION INTRAMUSCULAR; INTRAVENOUS AS NEEDED
Status: DISCONTINUED | OUTPATIENT
Start: 2022-02-16 | End: 2022-02-16 | Stop reason: SURG

## 2022-02-16 RX ADMIN — ONDANSETRON 4 MG: 2 INJECTION INTRAMUSCULAR; INTRAVENOUS at 12:48

## 2022-02-16 RX ADMIN — SODIUM CHLORIDE, POTASSIUM CHLORIDE, SODIUM LACTATE AND CALCIUM CHLORIDE: 600; 310; 30; 20 INJECTION, SOLUTION INTRAVENOUS at 11:55

## 2022-02-16 RX ADMIN — PROPOFOL 150 MG: 10 INJECTION, EMULSION INTRAVENOUS at 12:10

## 2022-02-16 RX ADMIN — GLYCOPYRROLATE 0.6 MG: 0.2 INJECTION INTRAMUSCULAR; INTRAVENOUS at 12:52

## 2022-02-16 RX ADMIN — PHENYLEPHRINE HYDROCHLORIDE 100 MCG: 10 INJECTION, SOLUTION INTRAVENOUS at 11:39

## 2022-02-16 RX ADMIN — PHENYLEPHRINE HYDROCHLORIDE 100 MCG: 10 INJECTION, SOLUTION INTRAVENOUS at 12:43

## 2022-02-16 RX ADMIN — LIDOCAINE HYDROCHLORIDE 100 MG: 20 INJECTION, SOLUTION INFILTRATION; PERINEURAL at 12:10

## 2022-02-16 RX ADMIN — FENTANYL CITRATE 50 MCG: 50 INJECTION INTRAMUSCULAR; INTRAVENOUS at 11:12

## 2022-02-16 RX ADMIN — ROPIVACAINE HYDROCHLORIDE 20 ML: 5 INJECTION EPIDURAL; INFILTRATION; PERINEURAL at 11:19

## 2022-02-16 RX ADMIN — CEFAZOLIN SODIUM 2 G: 2 INJECTION, SOLUTION INTRAVENOUS at 11:41

## 2022-02-16 RX ADMIN — PHENYLEPHRINE HYDROCHLORIDE 100 MCG: 10 INJECTION, SOLUTION INTRAVENOUS at 12:32

## 2022-02-16 RX ADMIN — FENTANYL CITRATE 100 MCG: 50 INJECTION INTRAMUSCULAR; INTRAVENOUS at 12:09

## 2022-02-16 RX ADMIN — ROCURONIUM BROMIDE 40 MG: 50 INJECTION INTRAVENOUS at 12:11

## 2022-02-16 RX ADMIN — NEOSTIGMINE METHYLSULFATE 3 MG: 0.5 INJECTION INTRAVENOUS at 12:52

## 2022-02-16 NOTE — DISCHARGE INSTRUCTIONS
Dewey Hood M.D.  [address] [address]  [phone]      Discharge Instructions for SHOULDER SURGERY ROTATOR CUFF OR LABRAL REPAIR    SPECIFIC POST-OP INSTRUCTIONS:  * SUTURES: Sutures are taken out 7-10 days post-op. This will be done during your first post-op appointment in our office. Please call [phone] to make your appointment.   * ICE: Ice helps to decrease both pain & swelling. Ice should be applied to the shoulder 20-25 minutes per hour while awake.   * DRESSING CHANGES: You can change dressing next day after surgery. You can remove tape, white gauze pads and small yellow gauze strips. We ask that you keep the incision clean and dry. Please use water proof Band-Aids to cover incision(s) while showering. These can be purchased at your local pharmacy.  These can be changed daily or as needed. Do not use any ointment on the incision(s).   * SHOWERING: The wound edges typically come together and seal by 3-5 days post-op if there is no drainage. Again, please use waterproof Band-Aids to cover incision(s) while showering. DO NOT SUBMERSE SHOULDER IN POOL, HOT TUB OR BATH UNTIL AT LEAST 3-4 WEEKS POST-OP (EVEN WITH WATERPROOF BANDAIDS).  * PAIN MEDICINE: You will be given a prescription for oral pain medication prior to discharge from the hospital. Please let us know if you have a sensitivity to certain pain medications prior to discharge. Additional pain medication prescriptions can be written, but must be picked up in either our [employer] or [place] office locations. They can NOT be called into your pharmacy.   * ORAL ANTI-INFLAMMATORIES:   You will be asked to discontinue ALL oral anti-inflammatories 2 weeks prior to surgery. You can resume these day of surgery - AFTER YOUR PROCEDURE/ONCE YOU ARE HOME.  These can be combined with the oral pain medications safely. DO NOT TAKE ADDITIONAL TYLENOL WITH THE NARCOTIC PAIN MEDICATION (it already has Tylenol in it). If you were not taking an  "anti-inflammatory pre-op, you can start one of them first day post-op. It will help decrease pain and swelling. Typical medications and dosages are as follows:   Advil/Motrin/Ibuprofen 200mg, 4 pills every 8 hours OR Aleve/Naproxen Sodium 220mg, 2 pills every 12 hours  * FOLLOW UP APPOINTMENT: You will need to call our office [phone] and make an appointment to follow up 7-10 days after your date of surgery. We can see you back sooner if there are any problems or concerns.   * SLING: We recommend you wear the sling at ALL times, other than when showering. During your first post-op visit, we will discuss the length of time you will wear the sling based on the specific surgical procedure that was performed.   * WEIGHT BEARING: We ask that you be non-weight bearing on the operative shoulder. Do not use the operative arm to lift/push/pull greater than 5lbs.   * PHYSICAL THERAPY: During your first post-op visit, again, depending on your specific procedure, we will discuss when you will start physical therapy. For a debridement/\"clean up\", physical therapy typically starts 7-10 days post-op. For a Bankart/labral/rotator cuff repair, physical therapy starts around 4-6 weeks. This can be done downstairs at [facility] or at a location of your choice. Physical therapy is typically 2-3 times a week for 4-6 weeks, depending on the procedure, the individual, and his/her progress.   * DRIVING A CAR: Medically/legally we cannot recommend you drive a car while in the sling or while on pain medication.   * RETURNING TO DAILY AND RECREATIONAL ACTIVITIES: For the most part patients can progress to daily activities as tolerated (keeping in mind the restrictions listed above). Initially, we do not want you to \"overdo\" it in an attempt to minimize post-op pain and swelling. Once the swelling is controlled, you can progress with activities as tolerated. Pain and swelling should be your guide to increasing your activity level.   * RETURN " TO WORK: The return to work date depends on many factors and is very dependent on the individual. You would most likely be able to return to a sedentary job after your first post-op visit (5-7 days after surgery). We can discuss specific work restrictions based on your job duties during this visit. A more physical job would obviously require a longer recovery time before return to work.        What to expect after a Nerve Block    Nerve blocks administered to block pain affect many types of nerves, including those nerves that control movement, pain, and normal sensation. Following a nerve block, you may notice some bruising at the site where the block was given. You may experience sensations such as: numbness of the affected area or limb, tingling, heaviness (that is the limb feels heavy to you), weakness or inability to move the affected arm or leg, or a feeling as if your arm or leg has “fallen asleep.”     A nerve block can last from 2 to 36 hours depending on the medications used.  Usually the weakness wears off first followed by the tingling and heaviness. As the block wears off, you may begin to notice pain; however, this sequence of events may occur in any order. Typically, you will be able to move your limb before you will feel it. Once a nerve block begins to wear off, the effects are usually completely gone within 60 minutes.  If you experience continued side effects that you believe are block related for longer than 48 hours, please call your healthcare provider. Please see block-specific instructions below.    Instructions for any block involving the shoulder or arm  • If you have had any kind of shoulder/arm block, you will go home with your arm in a sling. Wear the sling until the block has completely worn off. You may be required to wear it for a longer period of time per your surgeon’s recommendations.  • If you have had a shoulder/arm block, it is a good idea to sleep on a recliner with pillows  under your arm.    You may experience symptoms such as:  Shortness of breath  Hoarseness   Blurry vision  Unequal pupils  Drooping of your face on the same side as the block was performed    These are side effects associated with this kind of block and should go away within 12 hours.    Note: If you have severe or prolonged shortness of breath, please seek medical assistance as soon as possible.     Protection of a “blocked” arm or leg (limb)  • After a nerve block, you cannot feel pain, pressure, or extremes of temperature in the affected limb. And because of this, your blocked limb is at more risk for injury. For example, it is possible to burn your limb on an extremely hot surface without feeling it.     • When resting, it is important to reposition your limb periodically to avoid prolonged pressure on it. This may require the use of pillows and padding.    • While sleeping, you should avoid rolling onto the affected limb or putting too much pressure on it.     • If you have a cast or tight dressing, check the color of your fingers or toes of the affected limb. Call your surgeon if they look discolored (that is, dusky, dark colored).    • Use caution in cold weather. Cover your limb appropriately to protect it from the cold.      Pain Management:    Your surgeon will give you a prescription for pain medication. Begin taking this before the nerve block wears off. Bear in mind that sometimes the block can wear off in the middle of the night.

## 2022-02-16 NOTE — ANESTHESIA PROCEDURE NOTES
Airway  Urgency: elective    Date/Time: 2/16/2022 12:17 PM    General Information and Staff    Patient location during procedure: OR  Anesthesiologist: Catia López MD    Indications and Patient Condition  Indications for airway management: airway protection    Preoxygenated: yes  Mask difficulty assessment: 1 - vent by mask    Final Airway Details  Final airway type: endotracheal airway      Successful airway: ETT  Cuffed: yes   Successful intubation technique: direct laryngoscopy  Facilitating devices/methods: Bougie  Endotracheal tube insertion site: oral  Blade: Randee  Blade size: 3  ETT size (mm): 7.5  Cormack-Lehane Classification: grade III - view of epiglottis only  Placement verified by: chest auscultation and capnometry   Measured from: gums  Number of attempts at approach: 1  Assessment: lips, teeth, and gum same as pre-op and atraumatic intubation

## 2022-02-16 NOTE — OP NOTE
ORPROCDYN@  Procedure Note    Brian Martines Jr.  2/16/2022    Pre-op Diagnosis:   Right shoulder partial rotator cuff tear    Post-op Diagnosis:     Post-Op Diagnosis Codes:  Right shoulder partial rotator cuff tear  Labral tear right shoulder  Partial tear long head biceps tendon right shoulder     Procedure:  Right shoulder arthroscopy with rotator cuff and labral debridement  Right shoulder arthroscopic biceps tenotomy    Surgeon: Dewey Hood MD    Assistant: Dinah Martinez CSA     Anesthesia: General with Block  Anesthesiologist: Catia López MD      Staff:   Circulator: Kristine Wright RN  Scrub Person: Della Thibodeaux  Assistant: Dinah Martinez    Indication for Assistant: A surgical assistant, Dinah Martinez CSA , was utilized during this procedure and was present throughout the entire procedure allowing for safe completion of the procedure, reducing overall operative time and morbidity for the patient.      Complications: None    IV antibiotic: Kefzol    Implants: None    Procedure: The patient was taken to the operative suite.  After adequate anesthesia was established the upper extremity was prepped and draped in the usual fashion with the patient in a beachchair position.  The head was kept in a neutral position and bony prominences were padded.  A posterior portal was made.  The arthroscope was introduced into the glenohumeral joint.  An anterior portal was made in the rotator interval and diagnostic arthroscopy commenced.  A thorough inspection of the intra-articular structures was carried out.  The glenohumeral joint had obvious effusion which was suction debrided.  There was extensive tearing of the superior labrum with instability of the biceps labral complex.  There was partial thickness tearing of the intra-articular segment of the biceps that was debrided with a full-radius dissector along the leading edge but still there was notable instability the biceps and the  irregularity.  For this reason a tenotomy was performed at the base of the biceps and further debridement and cauterization of the labrum was carried out.  The undersurface of the rotator cuff had partial-thickness tearing but no full-thickness component visualized.  Light undersurface debridement of the tendon was carried out.  The glenohumeral articular cartilage was in generally good condition.    The arthroscope was placed in the subacromial space and a lateral working portal was made.  There was no evidence of fragmented bursa or impingement.  He had had a prior acromioplasty that looked certainly adequate.  The rotator cuff on the bursal side was intact and was photographed and probed.  Some anterior bursal tissue was debrided to make sure there was no adhesions. The subacromial space was vigorously irrigated and suctioned.  The instruments were removed.  The portals were closed with 4-0 nylon interrupted.  A sterile compression dressing and sling were applied.  The patient was awoken and taken to the postanesthetic recovery unit in good condition.    Estimated Blood Loss: minimal    Specimens: * No orders in the log *    Dewey Hood MD     Date: 2/16/2022  Time: 12:48 EST

## 2022-02-16 NOTE — ANESTHESIA POSTPROCEDURE EVALUATION
Patient: Brian Martines Jr.    Procedure Summary     Date: 02/16/22 Room / Location:  [facility] [address] [place] [address] [facility]    Anesthesia Start: 1204 Anesthesia Stop: 1259    Procedure: RIGHT SHOULDER ARTHROSCOPY, BICEPS TENOTOMY ROTATOR CUFF AND LABRAL DEBIDEMENT (Right Shoulder) Diagnosis:     Surgeons: Dewey Hood MD Provider: Catia López MD    Anesthesia Type: general with block ASA Status: 3          Anesthesia Type: general with block    Vitals  Vitals Value Taken Time   /77 02/16/22 1346   Temp 36.3 °C (97.4 °F) 02/16/22 1304   Pulse 77 02/16/22 1348   Resp 16 02/16/22 1345   SpO2 96 % 02/16/22 1348   Vitals shown include unvalidated device data.        Post Anesthesia Care and Evaluation    Patient location during evaluation: PACU  Patient participation: complete - patient participated  Level of consciousness: awake and alert  Pain management: adequate  Airway patency: patent  Anesthetic complications: No anesthetic complications  PONV Status: controlled  Cardiovascular status: acceptable and hemodynamically stable  Respiratory status: acceptable and spontaneous ventilation  Hydration status: acceptable    Comments: /77   Pulse 73   Temp 36.3 °C (97.4 °F) (Temporal)   Resp 16   SpO2 95%

## 2022-02-16 NOTE — ANESTHESIA PREPROCEDURE EVALUATION
Anesthesia Evaluation     Patient summary reviewed and Nursing notes reviewed   NPO Solid Status: > 8 hours  NPO Liquid Status: > 2 hours           Airway   Mallampati: II  TM distance: >3 FB  Neck ROM: full  No difficulty expected  Dental - normal exam     Pulmonary - negative pulmonary ROS and normal exam   Cardiovascular - normal exam  Exercise tolerance: good (4-7 METS)    ECG reviewed    (+) hypertension, CAD, dysrhythmias, hyperlipidemia,       Neuro/Psych- negative ROS  GI/Hepatic/Renal/Endo - negative ROS     Musculoskeletal     Abdominal    Substance History      OB/GYN          Other   arthritis,                      Anesthesia Plan    ASA 3     general with block     intravenous induction     Anesthetic plan, all risks, benefits, and alternatives have been provided, discussed and informed consent has been obtained with: patient.    Plan discussed with CRNA.        CODE STATUS:

## 2022-02-16 NOTE — ANESTHESIA PROCEDURE NOTES
Peripheral Block    Pre-sedation assessment completed: 2/16/2022 11:16 AM    Patient reassessed immediately prior to procedure    Patient location during procedure: pre-op  Start time: 2/16/2022 11:16 AM  Stop time: 2/16/2022 11:19 AM  Reason for block: at surgeon's request and post-op pain management  Performed by  Anesthesiologist: Vivek Stubbs MD  Preanesthetic Checklist  Completed: patient identified, IV checked, site marked, risks and benefits discussed, surgical consent, monitors and equipment checked, pre-op evaluation and timeout performed  Prep:  Pt Position: sitting  Sterile barriers:cap, gloves, mask and washed/disinfected hands  Prep: ChloraPrep  Patient monitoring: blood pressure monitoring, continuous pulse oximetry and EKG  Procedure    Sedation: yes    Guidance:ultrasound guided    ULTRASOUND INTERPRETATION.  Using ultrasound guidance a 22 G gauge needle was placed in close proximity to the brachial plexus nerve, at which point, under ultrasound guidance anesthetic was injected in the area of the nerve and spread of the anesthesia was seen on ultrasound in close proximity thereto.  There were no abnormalities seen on ultrasound; a digital image was taken; and the patient tolerated the procedure with no complications. Images:still images obtained, printed/placed on chart    Laterality:right  Block Type:interscalene  Injection Technique:single-shot  Needle Type:echogenic  Resistance on Injection: less than 15 psi    Medications Used: ropivacaine (NAROPIN) 0.5 % injection, 20 mL  Med administered at 2/16/2022 11:19 AM      Post Assessment  Injection Assessment: negative aspiration for heme, no paresthesia on injection and incremental injection  Patient Tolerance:comfortable throughout block  Complications:no  Additional Notes  Ultrasound guidance utilized for placement of needle and visualization of medication dispersement.

## 2022-02-16 NOTE — ANESTHESIA POSTPROCEDURE EVALUATION
Patient: Brian Martines Jr.    Procedure Summary     Date: 02/16/22 Room / Location:  [facility] [address] [place] [address] [facility]    Anesthesia Start: 1204 Anesthesia Stop: 1259    Procedure: RIGHT SHOULDER ARTHROSCOPY, BICEPS TENOTOMY ROTATOR CUFF AND LABRAL DEBIDEMENT (Right Shoulder) Diagnosis:     Surgeons: Dewey Hood MD Provider: Catia López MD    Anesthesia Type: general with block ASA Status: 3          Anesthesia Type: general with block    Vitals  Vitals Value Taken Time   /77 02/16/22 1346   Temp 36.3 °C (97.4 °F) 02/16/22 1304   Pulse 77 02/16/22 1348   Resp 16 02/16/22 1345   SpO2 96 % 02/16/22 1348   Vitals shown include unvalidated device data.        Post Anesthesia Care and Evaluation    Patient location during evaluation: bedside  Patient participation: complete - patient participated  Level of consciousness: awake and alert  Pain management: adequate  Airway patency: patent  Anesthetic complications: No anesthetic complications    Cardiovascular status: acceptable  Respiratory status: acceptable  Hydration status: acceptable    Comments: /77   Pulse 73   Temp 36.3 °C (97.4 °F) (Temporal)   Resp 16   SpO2 95%

## 2023-10-19 ENCOUNTER — OFFICE VISIT (OUTPATIENT)
Dept: FAMILY MEDICINE CLINIC | Facility: CLINIC | Age: 78
End: 2023-10-19
Payer: MEDICARE

## 2023-10-19 VITALS
DIASTOLIC BLOOD PRESSURE: 72 MMHG | SYSTOLIC BLOOD PRESSURE: 130 MMHG | WEIGHT: 253 LBS | HEART RATE: 71 BPM | RESPIRATION RATE: 18 BRPM | OXYGEN SATURATION: 96 % | BODY MASS INDEX: 35.42 KG/M2 | HEIGHT: 71 IN

## 2023-10-19 DIAGNOSIS — R07.89 ATYPICAL CHEST PAIN: Primary | ICD-10-CM

## 2023-10-19 DIAGNOSIS — G89.29 CHRONIC THORACIC BACK PAIN, UNSPECIFIED BACK PAIN LATERALITY: ICD-10-CM

## 2023-10-19 DIAGNOSIS — M54.6 CHRONIC THORACIC BACK PAIN, UNSPECIFIED BACK PAIN LATERALITY: ICD-10-CM

## 2023-10-19 PROBLEM — Z96.1 BILATERAL PSEUDOPHAKIA: Status: ACTIVE | Noted: 2018-05-09

## 2023-10-19 PROBLEM — M17.11 OSTEOARTHRITIS OF RIGHT KNEE: Status: ACTIVE | Noted: 2023-02-20

## 2023-10-19 PROBLEM — H25.9 AGE-RELATED CATARACT OF BOTH EYES: Status: ACTIVE | Noted: 2017-05-02

## 2023-10-19 PROBLEM — H35.369 MACULAR DRUSEN: Status: ACTIVE | Noted: 2021-12-13

## 2023-10-19 PROCEDURE — 99203 OFFICE O/P NEW LOW 30 MIN: CPT | Performed by: INTERNAL MEDICINE

## 2023-10-19 PROCEDURE — 3078F DIAST BP <80 MM HG: CPT | Performed by: INTERNAL MEDICINE

## 2023-10-19 PROCEDURE — 3075F SYST BP GE 130 - 139MM HG: CPT | Performed by: INTERNAL MEDICINE

## 2023-10-19 NOTE — PROGRESS NOTES
Subjective chief complaint is chest and back pain  Brian Martines Jr. is a 78 y.o. male.     History of Present Illness Brian is here today for pain.  Some of this is in his right upper chest.  He points in the region of his right clavicle and first rib.  This pain generally is not severe but for a few days it was severe.  He is also complaining of the pain in his lower back.  He points to the region of of T11 and T12.  This seems to then radiate to both sides of the chest and abdomen.  This has been going on for years.  I did review some scans he had at [facility].  I cannot see the actual images.  He had a CT scan of the chest in 2018 that looked normal.  He had a recent PET CT of the heart where they had a little difficulty viewing the lungs but nothing was mentioned significantly about the lungs.  He is not necessarily short of breath with this.  Although he says it feels somewhat like pleurisy.  He does have a prior history of a right seventh and eighth rib fracture.  He does have a prior history of some shingles affecting his left chest.    The following portions of the patient's history were reviewed and updated as appropriate: allergies, current medications, and problem list.    Review of Systems   Constitutional:  Negative for chills and fever.   Respiratory:  Negative for chest tightness and shortness of breath.    Cardiovascular:  Positive for chest pain.   Musculoskeletal:  Positive for back pain.       Objective   Physical Exam  Vitals and nursing note reviewed.   Neck:      Vascular: No carotid bruit.      Comments: He has some mild limitation to lateral rotation of the cervical spine in both directions.  He has somewhat limited abduction of the cervical spine in both directions.  Cardiovascular:      Rate and Rhythm: Normal rate and regular rhythm.   Pulmonary:      Effort: Pulmonary effort is normal.      Breath sounds: No wheezing, rhonchi or rales.   Chest:      Chest wall: No tenderness.    Musculoskeletal:      Comments: There is no focal point tenderness in the back.   Skin:     Findings: No rash.   Neurological:      Mental Status: He is alert.           Assessment & Plan   Diagnoses and all orders for this visit:    1. Atypical chest pain (Primary)    2. Chronic thoracic back pain, unspecified back pain laterality    Brian is here today for some chest and back pain.  He had a recent significant cardiac work-up that was unremarkable.  This is not typical for cardiac chest pain.  I suspect that his problem may be coming from the neck in terms of the upper chest discomfort.  He may also have a problem in his back causing the pain in the back radiating around to both sides.  His lungs are clear and I do not hear any pleuritic rubs.  I did offer to do a CAT scan of the chest to prove that this was not due to something in the lungs but he does not want to proceed with that.  I also advised that we could look at his neck with an MRI of the cervical spine in his back with an MRI of the thoracic spine but he does not want to pursue that at present either.

## 2023-11-22 ENCOUNTER — OFFICE VISIT (OUTPATIENT)
Dept: FAMILY MEDICINE CLINIC | Facility: CLINIC | Age: 78
End: 2023-11-22
Payer: MEDICARE

## 2023-11-22 VITALS
BODY MASS INDEX: 32.76 KG/M2 | OXYGEN SATURATION: 98 % | WEIGHT: 234 LBS | DIASTOLIC BLOOD PRESSURE: 72 MMHG | SYSTOLIC BLOOD PRESSURE: 128 MMHG | HEART RATE: 76 BPM | HEIGHT: 71 IN

## 2023-11-22 DIAGNOSIS — L02.215 ABSCESS OF SUPERFICIAL PERINEAL SPACE: Primary | ICD-10-CM

## 2023-11-22 PROCEDURE — 3074F SYST BP LT 130 MM HG: CPT | Performed by: INTERNAL MEDICINE

## 2023-11-22 PROCEDURE — 99214 OFFICE O/P EST MOD 30 MIN: CPT | Performed by: INTERNAL MEDICINE

## 2023-11-22 PROCEDURE — 3078F DIAST BP <80 MM HG: CPT | Performed by: INTERNAL MEDICINE

## 2023-11-22 RX ORDER — CEFADROXIL 500 MG/1
500 CAPSULE ORAL 2 TIMES DAILY
Qty: 14 CAPSULE | Refills: 0 | Status: SHIPPED | OUTPATIENT
Start: 2023-11-22

## 2023-11-22 NOTE — PROGRESS NOTES
Subjective chief complaint is a boil  Brian Martines Jr. is a 78 y.o. male.     History of Present Illness Brian is here today for possible boil.  He has a painful nodule near his anus.  It has been present for several days.  It may be getting a little bit better since he has been sitting on a heating pad.  He is not having fever or chills.  Is been no drainage from it.    The following portions of the patient's history were reviewed and updated as appropriate: allergies, current medications, and problem list.    Review of Systems   Constitutional:  Negative for chills and fever.   Gastrointestinal:  Negative for constipation and diarrhea.       Objective   Physical Exam  Constitutional:       Appearance: Normal appearance.   Cardiovascular:      Rate and Rhythm: Normal rate.   Pulmonary:      Effort: Pulmonary effort is normal.   Genitourinary:     Comments: There is a perineal abscess at the 5 o'clock position.  There is a punctum and there is some fluctuance but no expressible purulence.  It measures approximately 3 cm.  Neurological:      Mental Status: He is alert.           Assessment & Plan   Diagnoses and all orders for this visit:    1. Abscess of superficial perineal space (Primary)  -     Ambulatory Referral to General Surgery    Other orders  -     cefadroxil (DURICEF) 500 MG capsule; Take 1 capsule by mouth 2 (Two) Times a Day.  Dispense: 14 capsule; Refill: 0    Brian is here today for what appears to be an abscess in the perineum.  I am going to have him continue to use some moist heat.  I am going to start him on some Duricef.  I am going to refer him to general surgeon.  I did advise that even if it opens up and drains he still needs to keep the appointment with the surgeon.

## 2023-11-30 ENCOUNTER — TELEPHONE (OUTPATIENT)
Dept: SURGERY | Facility: CLINIC | Age: 78
End: 2023-11-30

## 2023-11-30 NOTE — TELEPHONE ENCOUNTER
Provider: DR EMERY    Caller: DANNY VALENZUELA JR    Relationship to Patient: SELF    Phone Number: [phone]    Reason for Call: PT CALLED TO CANCEL TODAYS APPT. PT SAID HE IS ALL BETTER. NO CALL BACK NEEDED

## 2024-02-12 ENCOUNTER — HOSPITAL ENCOUNTER (OUTPATIENT)
Dept: GENERAL RADIOLOGY | Facility: HOSPITAL | Age: 79
Discharge: HOME | End: 2024-02-12
Payer: MEDICARE

## 2024-02-12 ENCOUNTER — PRE-ADMISSION TESTING (OUTPATIENT)
Dept: PREADMISSION TESTING | Facility: HOSPITAL | Age: 79
End: 2024-02-12
Payer: MEDICARE

## 2024-02-12 VITALS
HEIGHT: 71 IN | HEART RATE: 70 BPM | WEIGHT: 245 LBS | BODY MASS INDEX: 34.3 KG/M2 | DIASTOLIC BLOOD PRESSURE: 85 MMHG | OXYGEN SATURATION: 98 % | SYSTOLIC BLOOD PRESSURE: 129 MMHG | RESPIRATION RATE: 20 BRPM | TEMPERATURE: 97.3 F

## 2024-02-12 LAB
ALBUMIN SERPL-MCNC: 4.3 G/DL (ref 3.5–5.2)
ALBUMIN/GLOB SERPL: 1.5 G/DL
ALP SERPL-CCNC: 53 U/L (ref 39–117)
ALT SERPL W P-5'-P-CCNC: 28 U/L (ref 1–41)
ANION GAP SERPL CALCULATED.3IONS-SCNC: 10.9 MMOL/L (ref 5–15)
AST SERPL-CCNC: 26 U/L (ref 1–40)
BASOPHILS # BLD AUTO: 0.08 10*3/MM3 (ref 0–0.2)
BASOPHILS NFR BLD AUTO: 0.8 % (ref 0–1.5)
BILIRUB SERPL-MCNC: 0.9 MG/DL (ref 0–1.2)
BUN SERPL-MCNC: 14 MG/DL (ref 8–23)
BUN/CREAT SERPL: 15.1 (ref 7–25)
CALCIUM SPEC-SCNC: 9 MG/DL (ref 8.6–10.5)
CHLORIDE SERPL-SCNC: 102 MMOL/L (ref 98–107)
CO2 SERPL-SCNC: 24.1 MMOL/L (ref 22–29)
CREAT SERPL-MCNC: 0.93 MG/DL (ref 0.76–1.27)
DEPRECATED RDW RBC AUTO: 42.7 FL (ref 37–54)
EGFRCR SERPLBLD CKD-EPI 2021: 83 ML/MIN/1.73
EOSINOPHIL # BLD AUTO: 0.27 10*3/MM3 (ref 0–0.4)
EOSINOPHIL NFR BLD AUTO: 2.9 % (ref 0.3–6.2)
ERYTHROCYTE [DISTWIDTH] IN BLOOD BY AUTOMATED COUNT: 13 % (ref 12.3–15.4)
GLOBULIN UR ELPH-MCNC: 2.8 GM/DL
GLUCOSE SERPL-MCNC: 102 MG/DL (ref 65–99)
HCT VFR BLD AUTO: 46.2 % (ref 37.5–51)
HGB BLD-MCNC: 15.9 G/DL (ref 13–17.7)
IMM GRANULOCYTES # BLD AUTO: 0.1 10*3/MM3 (ref 0–0.05)
IMM GRANULOCYTES NFR BLD AUTO: 1.1 % (ref 0–0.5)
LYMPHOCYTES # BLD AUTO: 2.7 10*3/MM3 (ref 0.7–3.1)
LYMPHOCYTES NFR BLD AUTO: 28.5 % (ref 19.6–45.3)
MCH RBC QN AUTO: 31 PG (ref 26.6–33)
MCHC RBC AUTO-ENTMCNC: 34.4 G/DL (ref 31.5–35.7)
MCV RBC AUTO: 90.1 FL (ref 79–97)
MONOCYTES # BLD AUTO: 0.72 10*3/MM3 (ref 0.1–0.9)
MONOCYTES NFR BLD AUTO: 7.6 % (ref 5–12)
NEUTROPHILS NFR BLD AUTO: 5.59 10*3/MM3 (ref 1.7–7)
NEUTROPHILS NFR BLD AUTO: 59.1 % (ref 42.7–76)
NRBC BLD AUTO-RTO: 0 /100 WBC (ref 0–0.2)
PLATELET # BLD AUTO: 206 10*3/MM3 (ref 140–450)
PMV BLD AUTO: 9.8 FL (ref 6–12)
POTASSIUM SERPL-SCNC: 4 MMOL/L (ref 3.5–5.2)
PROT SERPL-MCNC: 7.1 G/DL (ref 6–8.5)
QT INTERVAL: 431 MS
QTC INTERVAL: 466 MS
RBC # BLD AUTO: 5.13 10*6/MM3 (ref 4.14–5.8)
SODIUM SERPL-SCNC: 137 MMOL/L (ref 136–145)
WBC NRBC COR # BLD AUTO: 9.46 10*3/MM3 (ref 3.4–10.8)

## 2024-02-12 PROCEDURE — 36415 COLL VENOUS BLD VENIPUNCTURE: CPT

## 2024-02-12 PROCEDURE — 71046 X-RAY EXAM CHEST 2 VIEWS: CPT

## 2024-02-12 PROCEDURE — 93005 ELECTROCARDIOGRAM TRACING: CPT

## 2024-02-12 PROCEDURE — 80053 COMPREHEN METABOLIC PANEL: CPT

## 2024-02-12 PROCEDURE — 85025 COMPLETE CBC W/AUTO DIFF WBC: CPT

## 2024-02-12 RX ORDER — ELECTROLYTES/DEXTROSE
1 SOLUTION, ORAL ORAL DAILY
COMMUNITY

## 2024-02-12 RX ORDER — ACETAMINOPHEN 500 MG
1000 TABLET ORAL EVERY 6 HOURS PRN
COMMUNITY

## 2024-02-13 NOTE — H&P
HPI  Chief complaint left shoulder pain.  History of present illness: This 78-year-old  male who complains of left shoulder pain with reaching use. He has had continued pain over the past several months despite a course of conservative care including Medrol Dosepak and physical therapy exercises. He will not take an injection as he does not like needles. He had an MRI and is here to evaluate results to discuss next steps in treatment. He gets intermittent popping. The pain greatly affects his ability to sleep due to the radiating nature of it. Pain levels between 4-5 out of 10 at rest up to 6 or 7 out of 10 with activity. He has tried rest and thus far  Physical Exam  Left shoulder:  Skin is normal. There is no atrophy. There is no effusion. There is no warmth. No erythema. Lymphadenopathy is negative.  Shoulder active ROM today shows: Elevation = 160; ER(side) = 60; ER(abd) = 90; IR(abd) = 60; IR(vert) = T12; Abduction = 150.  Shoulder strength: Elevation = 4/5; ER = 4/5; IR = 5/5; ABD = 5/5.  No crepitation.  There is tenderness in the anterior lateral acromion.  Neer test is positive. Pederson test is positive. Empty can test was positive. Static scapular winging test is negative. Quitman's test is negative.  Pulses are normal. Normal sensation. The c-spine has normal motion. Spurling right is negative. Spurling left is negative. Capillary refill is normal. NV intact distally.  Assessment / Plan  MRI left shoulder 1/22/2024 LOC, images reviewed and independently interpreted, demonstrates partial-thickness rotator cuff tear involving the supraspinatus tendon. Type I labral degenerative tearing noted intra-articular. Tendinosis long head biceps tendon tear.    Assessment: Patient has partial rotator cuff tear and impingement findings with type I SLAP tear unamenable to conservative care. He has been putting up with the pain for many months. He wants to move forward with surgical management which is  certainly appropriate at this point.    Plan: I recommend left shoulder arthroscopy with debridement extensive.    We discussed the benefits and risks of potential surgical intervention, as well as alternative treatments. Potential surgical risks and complications include but are not limited to bleeding, infection, failure of repaired structures to heal biologically, stiffness, recurrence, nerve or vascular injury, residual pain, and the possibility of revision surgery and prolonged convalescence. Sufficient opportunity was given to discuss the condition and treatment plan and all questions were answered for the patient. The patient agreed to proceed with the proposed surgical management.    This patient will experience restricted and limited mobility post-surgically. A Caprini DVT Risk Assessment for development of deep vein thrombosis (DVT) during the first 30 days of the post-procedural period finds this patient at high risk. Based on the patient´s limited mobility following surgery, and past medical history and post-op risk of DVT, I am ordering home-use Portable Mechanical Compression devices (PMC) to stimulate circulation, decrease swelling, and reduce the likelihood of a VTE event. I have prescribed Portable Mechanical Compression devices for home-use for a period of 30 days post-surgery. I find this to be medically necessary to limit any additional risk of complications and bleeding.  Portable Mechanical Compression devices applied to the lower extremities will be used 3 hours QD and any time the patient is at rest, including during bed rest.    The patient understands these are provided in lieu of or in conjunction with a chemoprophylaxis, as the use of heparin-derived chemoprophylaxis for deep vein thrombosis (DVT) following medical procedures is associated with significantly increased risks of bleeding complications, which is considered a contraindication in these procedures. The use of a PMC device kit  is a proven alternative which safely increases volumetric venous blood flow and decreases the rate of post-procedure VTE/DVT/PE and will be post-surgically applied without delay for this patient.    Portable Mechanical Compression devices are recommended for patients who receive pharmacologic prophylaxis, using combined prophylaxis with mechanical and pharmacological methods over prophylaxis with pharmacological agents alone. The use of PMC devices for home-therapy DVT prevention is a proven alternative which safely decreases the rate of post-procedure DVT and should be approved without delay for this patient. In my opinion, this is medically necessary and reasonable, and is in accordance with accepted standards of contemporary medical practices, as referenced within the following studies:    Journal of Arthroplasty 2017. Efficacy in Deep Vein Thrombosis Prevention With Extended Mechanical Compression Device Therapy and Prophylactic Aspirin Following Total Knee Arthroplasty: A Randomized Control Trial. May; 32(5):4829-0898)    Association of periOperative Registered Nurses (AORN). (2019). 2019 Venous Thromboembolism, AORN Guidelines for Perioperative Practice. AORN Journal, 109(2), Q958-Y508. doi: 10.1002/aorn.05102    American Society of Hematology (2019). American Society of Hematology 2019 guidelines for management of venous thromboembolism: prevention of venous thromboembolism in surgical hospitalized patients. Blood Advances, 3(23), 2333-6977. doi: 10.1182/bloodadvances.5588312594    1. Osteoarthritis of left glenohumeral joint  M19.012: Primary osteoarthritis, left shoulder    2. Non-traumatic partial tear of left rotator cuff  M75.112: Incomplete rotator cuff tear or rupture of left shoulder, not specified as traumatic

## 2024-02-15 ENCOUNTER — ANESTHESIA (OUTPATIENT)
Dept: PERIOP | Facility: HOSPITAL | Age: 79
End: 2024-02-15
Payer: MEDICARE

## 2024-02-15 ENCOUNTER — HOSPITAL ENCOUNTER (OUTPATIENT)
Facility: HOSPITAL | Age: 79
Setting detail: HOSPITAL OUTPATIENT SURGERY
Discharge: HOME | End: 2024-02-15
Attending: ORTHOPAEDIC SURGERY | Admitting: ORTHOPAEDIC SURGERY
Payer: MEDICARE

## 2024-02-15 ENCOUNTER — ANESTHESIA EVENT (OUTPATIENT)
Dept: PERIOP | Facility: HOSPITAL | Age: 79
End: 2024-02-15
Payer: MEDICARE

## 2024-02-15 VITALS
RESPIRATION RATE: 16 BRPM | HEART RATE: 70 BPM | TEMPERATURE: 97.7 F | DIASTOLIC BLOOD PRESSURE: 83 MMHG | OXYGEN SATURATION: 95 % | SYSTOLIC BLOOD PRESSURE: 134 MMHG

## 2024-02-15 DIAGNOSIS — Z98.890 STATUS POST SHOULDER SURGERY: Primary | ICD-10-CM

## 2024-02-15 PROCEDURE — 25810000003 LACTATED RINGERS PER 1000 ML: Performed by: STUDENT IN AN ORGANIZED HEALTH CARE EDUCATION/TRAINING PROGRAM

## 2024-02-15 PROCEDURE — 25010000002 PROPOFOL 200 MG/20ML EMULSION: Performed by: NURSE ANESTHETIST, CERTIFIED REGISTERED

## 2024-02-15 PROCEDURE — 25010000002 ROPIVACAINE PER 1 MG: Performed by: STUDENT IN AN ORGANIZED HEALTH CARE EDUCATION/TRAINING PROGRAM

## 2024-02-15 PROCEDURE — 25010000002 DEXAMETHASONE PER 1 MG: Performed by: STUDENT IN AN ORGANIZED HEALTH CARE EDUCATION/TRAINING PROGRAM

## 2024-02-15 PROCEDURE — 25010000002 ONDANSETRON PER 1 MG: Performed by: NURSE ANESTHETIST, CERTIFIED REGISTERED

## 2024-02-15 PROCEDURE — 25810000003 LACTATED RINGERS PER 1000 ML: Performed by: ORTHOPAEDIC SURGERY

## 2024-02-15 PROCEDURE — 25010000002 EPINEPHRINE PER 0.1 MG: Performed by: ORTHOPAEDIC SURGERY

## 2024-02-15 PROCEDURE — 25010000002 CEFAZOLIN IN DEXTROSE 2-4 GM/100ML-% SOLUTION: Performed by: ORTHOPAEDIC SURGERY

## 2024-02-15 PROCEDURE — 25010000002 FENTANYL CITRATE (PF) 50 MCG/ML SOLUTION: Performed by: STUDENT IN AN ORGANIZED HEALTH CARE EDUCATION/TRAINING PROGRAM

## 2024-02-15 PROCEDURE — 25010000002 LABETALOL 5 MG/ML SOLUTION: Performed by: NURSE ANESTHETIST, CERTIFIED REGISTERED

## 2024-02-15 RX ORDER — DROPERIDOL 2.5 MG/ML
0.62 INJECTION, SOLUTION INTRAMUSCULAR; INTRAVENOUS
Status: DISCONTINUED | OUTPATIENT
Start: 2024-02-15 | End: 2024-02-15 | Stop reason: HOSPADM

## 2024-02-15 RX ORDER — OXYCODONE HYDROCHLORIDE AND ACETAMINOPHEN 5; 325 MG/1; MG/1
1 TABLET ORAL EVERY 4 HOURS PRN
Qty: 30 TABLET | Refills: 0 | Status: SHIPPED | OUTPATIENT
Start: 2024-02-15

## 2024-02-15 RX ORDER — LIDOCAINE HYDROCHLORIDE 10 MG/ML
0.5 INJECTION, SOLUTION INFILTRATION; PERINEURAL ONCE AS NEEDED
Status: DISCONTINUED | OUTPATIENT
Start: 2024-02-15 | End: 2024-02-15 | Stop reason: HOSPADM

## 2024-02-15 RX ORDER — LABETALOL HYDROCHLORIDE 5 MG/ML
5 INJECTION, SOLUTION INTRAVENOUS
Status: DISCONTINUED | OUTPATIENT
Start: 2024-02-15 | End: 2024-02-15 | Stop reason: HOSPADM

## 2024-02-15 RX ORDER — DIPHENHYDRAMINE HYDROCHLORIDE 50 MG/ML
12.5 INJECTION INTRAMUSCULAR; INTRAVENOUS
Status: DISCONTINUED | OUTPATIENT
Start: 2024-02-15 | End: 2024-02-15 | Stop reason: HOSPADM

## 2024-02-15 RX ORDER — HYDRALAZINE HYDROCHLORIDE 20 MG/ML
5 INJECTION INTRAMUSCULAR; INTRAVENOUS
Status: DISCONTINUED | OUTPATIENT
Start: 2024-02-15 | End: 2024-02-15 | Stop reason: HOSPADM

## 2024-02-15 RX ORDER — SODIUM CHLORIDE 0.9 % (FLUSH) 0.9 %
3-10 SYRINGE (ML) INJECTION AS NEEDED
Status: DISCONTINUED | OUTPATIENT
Start: 2024-02-15 | End: 2024-02-15 | Stop reason: HOSPADM

## 2024-02-15 RX ORDER — HYDROCODONE BITARTRATE AND ACETAMINOPHEN 7.5; 325 MG/1; MG/1
1 TABLET ORAL EVERY 4 HOURS PRN
Status: DISCONTINUED | OUTPATIENT
Start: 2024-02-15 | End: 2024-02-15 | Stop reason: HOSPADM

## 2024-02-15 RX ORDER — PROPOFOL 10 MG/ML
INJECTION, EMULSION INTRAVENOUS AS NEEDED
Status: DISCONTINUED | OUTPATIENT
Start: 2024-02-15 | End: 2024-02-15 | Stop reason: SURG

## 2024-02-15 RX ORDER — SODIUM CHLORIDE, SODIUM LACTATE, POTASSIUM CHLORIDE, CALCIUM CHLORIDE 600; 310; 30; 20 MG/100ML; MG/100ML; MG/100ML; MG/100ML
9 INJECTION, SOLUTION INTRAVENOUS CONTINUOUS
Status: DISCONTINUED | OUTPATIENT
Start: 2024-02-15 | End: 2024-02-15 | Stop reason: HOSPADM

## 2024-02-15 RX ORDER — ONDANSETRON 2 MG/ML
4 INJECTION INTRAMUSCULAR; INTRAVENOUS ONCE AS NEEDED
Status: DISCONTINUED | OUTPATIENT
Start: 2024-02-15 | End: 2024-02-15 | Stop reason: HOSPADM

## 2024-02-15 RX ORDER — HYDROCODONE BITARTRATE AND ACETAMINOPHEN 5; 325 MG/1; MG/1
1 TABLET ORAL ONCE AS NEEDED
Status: DISCONTINUED | OUTPATIENT
Start: 2024-02-15 | End: 2024-02-15 | Stop reason: HOSPADM

## 2024-02-15 RX ORDER — HYDROMORPHONE HYDROCHLORIDE 1 MG/ML
0.25 INJECTION, SOLUTION INTRAMUSCULAR; INTRAVENOUS; SUBCUTANEOUS
Status: DISCONTINUED | OUTPATIENT
Start: 2024-02-15 | End: 2024-02-15 | Stop reason: HOSPADM

## 2024-02-15 RX ORDER — IPRATROPIUM BROMIDE AND ALBUTEROL SULFATE 2.5; .5 MG/3ML; MG/3ML
3 SOLUTION RESPIRATORY (INHALATION) ONCE AS NEEDED
Status: DISCONTINUED | OUTPATIENT
Start: 2024-02-15 | End: 2024-02-15 | Stop reason: HOSPADM

## 2024-02-15 RX ORDER — PROMETHAZINE HYDROCHLORIDE 25 MG/1
25 SUPPOSITORY RECTAL ONCE AS NEEDED
Status: DISCONTINUED | OUTPATIENT
Start: 2024-02-15 | End: 2024-02-15 | Stop reason: HOSPADM

## 2024-02-15 RX ORDER — MIDAZOLAM HYDROCHLORIDE 1 MG/ML
1 INJECTION INTRAMUSCULAR; INTRAVENOUS
Status: DISCONTINUED | OUTPATIENT
Start: 2024-02-15 | End: 2024-02-15 | Stop reason: HOSPADM

## 2024-02-15 RX ORDER — ACETAMINOPHEN 500 MG
1000 TABLET ORAL ONCE
Status: COMPLETED | OUTPATIENT
Start: 2024-02-15 | End: 2024-02-15

## 2024-02-15 RX ORDER — ONDANSETRON 2 MG/ML
INJECTION INTRAMUSCULAR; INTRAVENOUS AS NEEDED
Status: DISCONTINUED | OUTPATIENT
Start: 2024-02-15 | End: 2024-02-15 | Stop reason: SURG

## 2024-02-15 RX ORDER — ROPIVACAINE HYDROCHLORIDE 5 MG/ML
INJECTION, SOLUTION EPIDURAL; INFILTRATION; PERINEURAL
Status: COMPLETED | OUTPATIENT
Start: 2024-02-15 | End: 2024-02-15

## 2024-02-15 RX ORDER — FENTANYL CITRATE 50 UG/ML
25 INJECTION, SOLUTION INTRAMUSCULAR; INTRAVENOUS
Status: DISCONTINUED | OUTPATIENT
Start: 2024-02-15 | End: 2024-02-15 | Stop reason: HOSPADM

## 2024-02-15 RX ORDER — CEFAZOLIN SODIUM 2 G/100ML
2000 INJECTION, SOLUTION INTRAVENOUS ONCE
Status: COMPLETED | OUTPATIENT
Start: 2024-02-15 | End: 2024-02-15

## 2024-02-15 RX ORDER — DEXAMETHASONE SODIUM PHOSPHATE 4 MG/ML
INJECTION, SOLUTION INTRA-ARTICULAR; INTRALESIONAL; INTRAMUSCULAR; INTRAVENOUS; SOFT TISSUE
Status: COMPLETED | OUTPATIENT
Start: 2024-02-15 | End: 2024-02-15

## 2024-02-15 RX ORDER — LIDOCAINE HYDROCHLORIDE 20 MG/ML
INJECTION, SOLUTION EPIDURAL; INFILTRATION; INTRACAUDAL; PERINEURAL AS NEEDED
Status: DISCONTINUED | OUTPATIENT
Start: 2024-02-15 | End: 2024-02-15 | Stop reason: SURG

## 2024-02-15 RX ORDER — SODIUM CHLORIDE 0.9 % (FLUSH) 0.9 %
3 SYRINGE (ML) INJECTION EVERY 12 HOURS SCHEDULED
Status: DISCONTINUED | OUTPATIENT
Start: 2024-02-15 | End: 2024-02-15 | Stop reason: HOSPADM

## 2024-02-15 RX ORDER — EPHEDRINE SULFATE 50 MG/ML
5 INJECTION, SOLUTION INTRAVENOUS ONCE AS NEEDED
Status: DISCONTINUED | OUTPATIENT
Start: 2024-02-15 | End: 2024-02-15 | Stop reason: HOSPADM

## 2024-02-15 RX ORDER — FENTANYL CITRATE 50 UG/ML
50 INJECTION, SOLUTION INTRAMUSCULAR; INTRAVENOUS ONCE AS NEEDED
Status: COMPLETED | OUTPATIENT
Start: 2024-02-15 | End: 2024-02-15

## 2024-02-15 RX ORDER — NALOXONE HCL 0.4 MG/ML
0.2 VIAL (ML) INJECTION AS NEEDED
Status: DISCONTINUED | OUTPATIENT
Start: 2024-02-15 | End: 2024-02-15 | Stop reason: HOSPADM

## 2024-02-15 RX ORDER — FLUMAZENIL 0.1 MG/ML
0.2 INJECTION INTRAVENOUS AS NEEDED
Status: DISCONTINUED | OUTPATIENT
Start: 2024-02-15 | End: 2024-02-15 | Stop reason: HOSPADM

## 2024-02-15 RX ORDER — PROMETHAZINE HYDROCHLORIDE 25 MG/1
25 TABLET ORAL ONCE AS NEEDED
Status: DISCONTINUED | OUTPATIENT
Start: 2024-02-15 | End: 2024-02-15 | Stop reason: HOSPADM

## 2024-02-15 RX ADMIN — PROPOFOL 250 MG: 10 INJECTION, EMULSION INTRAVENOUS at 11:08

## 2024-02-15 RX ADMIN — DEXAMETHASONE SODIUM PHOSPHATE 4 MG: 4 INJECTION, SOLUTION INTRA-ARTICULAR; INTRALESIONAL; INTRAMUSCULAR; INTRAVENOUS; SOFT TISSUE at 10:04

## 2024-02-15 RX ADMIN — ROPIVACAINE HYDROCHLORIDE 24 ML: 5 INJECTION EPIDURAL; INFILTRATION; PERINEURAL at 10:04

## 2024-02-15 RX ADMIN — SODIUM CHLORIDE, POTASSIUM CHLORIDE, SODIUM LACTATE AND CALCIUM CHLORIDE 9 ML/HR: 600; 310; 30; 20 INJECTION, SOLUTION INTRAVENOUS at 09:38

## 2024-02-15 RX ADMIN — LIDOCAINE HYDROCHLORIDE 100 MG: 20 INJECTION, SOLUTION EPIDURAL; INFILTRATION; INTRACAUDAL; PERINEURAL at 11:08

## 2024-02-15 RX ADMIN — LABETALOL HYDROCHLORIDE 5 MG: 5 INJECTION, SOLUTION INTRAVENOUS at 12:01

## 2024-02-15 RX ADMIN — FENTANYL CITRATE 50 MCG: 50 INJECTION, SOLUTION INTRAMUSCULAR; INTRAVENOUS at 09:59

## 2024-02-15 RX ADMIN — SODIUM CHLORIDE, POTASSIUM CHLORIDE, SODIUM LACTATE AND CALCIUM CHLORIDE: 600; 310; 30; 20 INJECTION, SOLUTION INTRAVENOUS at 12:11

## 2024-02-15 RX ADMIN — ONDANSETRON 4 MG: 2 INJECTION INTRAMUSCULAR; INTRAVENOUS at 11:54

## 2024-02-15 RX ADMIN — ACETAMINOPHEN 1000 MG: 500 TABLET ORAL at 10:09

## 2024-02-15 RX ADMIN — CEFAZOLIN SODIUM 2000 MG: 2 INJECTION, SOLUTION INTRAVENOUS at 10:53

## 2024-02-15 NOTE — OP NOTE
[facility]PROC[facility]@  Procedure Note    Brian Martines Jr.  2/15/2024    Pre-op Diagnosis:   Partial rotator cuff tear with impingement left shoulder    Post-op Diagnosis:     Post-Op Diagnosis Codes:  Partial rotator cuff tear with impingement left shoulder  Labral tear left shoulder     Procedure:  Left shoulder arthroscopy with debridement extensive, decompression and acromioplasty    Surgeon: Dewey Hood MD    Assistant: KIERRA Acosta    Anesthesia: General with Block  Anesthesiologist: Oziel Erwin MD  CRNA: Sheila Chacon CRNA      Staff:   Circulator: Mariaelena Boggs RN; Jovanny Ellis RN; Froilan Gordillo RN  Scrub Person: Irina Moore  Vendor Representative: To Ahn  Assistant: Zainab Matthew APRN    Indication for Assistant: A surgical assistant, KIERRA Acosta, was utilized during this procedure and was present throughout the entire procedure allowing for safe completion of the procedure, reducing overall operative time and morbidity for the patient.      Complications: None    IV antibiotic: Cefazolin    Implants: None    Procedure: The patient was taken to the operative suite.  After adequate anesthesia was established the upper extremity was prepped and draped in the usual fashion with the patient in a beachchair position.  The head was kept in a neutral position and bony prominences were padded.  A posterior portal was made.  The arthroscope was introduced into the glenohumeral joint.  An anterior portal was made in the rotator interval and diagnostic arthroscopy commenced.  A thorough inspection of the intra-articular structures was carried out.   Labral structures revealed degenerative tearing and type II SLAP tear with debridement up into the base of the biceps tendon.  As I debrided from 10:00 to 2:00 into the base of the biceps however I checked the insertion site and the biceps felt stable superiorly and therefore even though it was visualized as a  type II tear was really more of a type I type lesion.  Debridement was carried out circumferentially as there were some inferior degenerative labral tears.  There was also an undersurface partial-thickness supraspinatus tendon tear involving only about 10% thickness of the tendon but the flap of tissue which was only about 7 or 8 mm was debrided with a full-radius resector until smooth.  The arthroscope was placed in the subacromial space and a lateral working portal was made.  Fragmented bursa was noted with impingement findings.  Arthroscopic bursectomy was performed to remove loose fragmented bursa and to open up the subacromial space to better visualize the rotator cuff and the tear.  A large anterior acromial spur was noted.  This was impinging in the anterior outlet.  A decompressive anterior acromioplasty was performed with bur, shaver, and electrocautery until a smooth type I flat acromion was achieved and good outlet decompression was noted.  The coracoacromial ligament was released.  There was also inferior impingement medially from the inferior lateral aspect of the clavicle and this was tapered off with a bur and shaver relieving the soft tissue impingement inferior to the lateral clavicle.  This opened up the outlet to remove impingement findings.  Hemostasis was obtained with a cautery device.  The rotator cuff was inspected and the tendon was debrided on the bursal side and probed and visualized.  The cuff was intact on the bursal side.     Photographs were obtained.  The subacromial space was vigorously irrigated and suctioned.  The instruments were removed.  The portals were closed with 4-0 nylon interrupted.  A sterile compression dressing and sling were applied.  The patient was awoken and taken to the postanesthetic recovery unit in good condition.    Estimated Blood Loss: minimal    Specimens: * No orders in the log *    Dewey Hood MD     Date: 2/15/2024  Time: 12:24 EST

## 2024-02-15 NOTE — DISCHARGE INSTRUCTIONS
Dewey Hood M.D.  [address] [address]  [phone]      Discharge Instructions for SHOULDER SURGERY ROTATOR CUFF OR LABRAL REPAIR    SPECIFIC POST-OP INSTRUCTIONS:  * SUTURES: Sutures are taken out 7-10 days post-op. This will be done during your first post-op appointment in our office. Please call [phone] to make your appointment.   * ICE: Ice helps to decrease both pain & swelling. Ice should be applied to the shoulder 20-25 minutes per hour while awake.   * DRESSING CHANGES: You can change dressing next day after surgery. You can remove tape, white gauze pads and small yellow gauze strips. We ask that you keep the incision clean and dry. Please use water proof Band-Aids to cover incision(s) while showering. These can be purchased at your local pharmacy.  These can be changed daily or as needed. Do not use any ointment on the incision(s).   * SHOWERING: The wound edges typically come together and seal by 3-5 days post-op if there is no drainage. Again, please use waterproof Band-Aids to cover incision(s) while showering. DO NOT SUBMERSE SHOULDER IN POOL, HOT TUB OR BATH UNTIL AT LEAST 3-4 WEEKS POST-OP (EVEN WITH WATERPROOF BANDAIDS).  * PAIN MEDICINE: You will be given a prescription for oral pain medication prior to discharge from the hospital. Please let us know if you have a sensitivity to certain pain medications prior to discharge. Additional pain medication prescriptions can be written, but must be picked up in either our [place] or [place] office locations. They can NOT be called into your pharmacy.   * ORAL ANTI-INFLAMMATORIES:   You will be asked to discontinue ALL oral anti-inflammatories 2 weeks prior to surgery. You can resume these day of surgery - AFTER YOUR PROCEDURE/ONCE YOU ARE HOME.  These can be combined with the oral pain medications safely. DO NOT TAKE ADDITIONAL TYLENOL WITH THE NARCOTIC PAIN MEDICATION (it already has Tylenol in it). If you were not taking an  "anti-inflammatory pre-op, you can start one of them first day post-op. It will help decrease pain and swelling. Typical medications and dosages are as follows:   Advil/Motrin/Ibuprofen 200mg, 4 pills every 8 hours OR Aleve/Naproxen Sodium 220mg, 2 pills every 12 hours  * FOLLOW UP APPOINTMENT: You will need to call our office [phone] and make an appointment to follow up 7-10 days after your date of surgery. We can see you back sooner if there are any problems or concerns.   * SLING: We recommend you wear the sling at ALL times, other than when showering. During your first post-op visit, we will discuss the length of time you will wear the sling based on the specific surgical procedure that was performed.   * WEIGHT BEARING: We ask that you be non-weight bearing on the operative shoulder. Do not use the operative arm to lift/push/pull greater than 5lbs.   * PHYSICAL THERAPY: During your first post-op visit, again, depending on your specific procedure, we will discuss when you will start physical therapy. For a debridement/\"clean up\", physical therapy typically starts 7-10 days post-op. For a Bankart/labral/rotator cuff repair, physical therapy starts around 4-6 weeks. This can be done downstairs at [facility] or at a location of your choice. Physical therapy is typically 2-3 times a week for 4-6 weeks, depending on the procedure, the individual, and his/her progress.   * DRIVING A CAR: Medically/legally we cannot recommend you drive a car while in the sling or while on pain medication.   * RETURNING TO DAILY AND RECREATIONAL ACTIVITIES: For the most part patients can progress to daily activities as tolerated (keeping in mind the restrictions listed above). Initially, we do not want you to \"overdo\" it in an attempt to minimize post-op pain and swelling. Once the swelling is controlled, you can progress with activities as tolerated. Pain and swelling should be your guide to increasing your activity level.   * RETURN " TO WORK: The return to work date depends on many factors and is very dependent on the individual. You would most likely be able to return to a sedentary job after your first post-op visit (5-7 days after surgery). We can discuss specific work restrictions based on your job duties during this visit. A more physical job would obviously require a longer recovery time before return to work.            What to expect after a Nerve Block    Nerve blocks administered to block pain affect many types of nerves, including those nerves that control movement, pain, and normal sensation. Following a nerve block, you may notice some bruising at the site where the block was given. You may experience sensations such as: numbness of the affected area or limb, tingling, heaviness (that is the limb feels heavy to you), weakness or inability to move the affected arm or leg, or a feeling as if your arm or leg has “fallen asleep.”     A nerve block can last from 2 to 36 hours depending on the medications used.  Usually the weakness wears off first followed by the tingling and heaviness. As the block wears off, you may begin to notice pain; however, this sequence of events may occur in any order. Typically, you will be able to move your limb before you will feel it. Once a nerve block begins to wear off, the effects are usually completely gone within 60 minutes.  If you experience continued side effects that you believe are block related for longer than 48 hours, please call your healthcare provider. Please see block-specific instructions below.    Instructions for any block involving the shoulder or arm  If you have had any kind of shoulder/arm block, you will go home with your arm in a sling. Wear the sling until the block has completely worn off. You may be required to wear it for a longer period of time per your surgeon’s recommendations.  If you have had a shoulder/arm block, it is a good idea to sleep on a recliner with pillows  under your arm.    You may experience symptoms such as:  Shortness of breath  Hoarseness   Blurry vision  Unequal pupils  Drooping of your face on the same side as the block was performed    These are side effects associated with this kind of block and should go away within 12 hours.    Note: If you have severe or prolonged shortness of breath, please seek medical assistance as soon as possible.     Protection of a “blocked” arm or leg (limb)  After a nerve block, you cannot feel pain, pressure, or extremes of temperature in the affected limb. And because of this, your blocked limb is at more risk for injury. For example, it is possible to burn your limb on an extremely hot surface without feeling it.     When resting, it is important to reposition your limb periodically to avoid prolonged pressure on it. This may require the use of pillows and padding.    While sleeping, you should avoid rolling onto the affected limb or putting too much pressure on it.     If you have a cast or tight dressing, check the color of your fingers or toes of the affected limb. Call your surgeon if they look discolored (that is, dusky, dark colored).    Use caution in cold weather. Cover your limb appropriately to protect it from the cold.      Pain Management:    Your surgeon will give you a prescription for pain medication. Begin taking this before the nerve block wears off. Bear in mind that sometimes the block can wear off in the middle of the night.         What to expect after a Nerve Block    Nerve blocks administered to block pain affect many types of nerves, including those nerves that control movement, pain, and normal sensation. Following a nerve block, you may notice some bruising at the site where the block was given. You may experience sensations such as: numbness of the affected area or limb, tingling, heaviness (that is the limb feels heavy to you), weakness or inability to move the affected arm or leg, or a feeling as if  your arm or leg has “fallen asleep.”     A nerve block can last from 2 to 36 hours depending on the medications used.  Usually the weakness wears off first followed by the tingling and heaviness. As the block wears off, you may begin to notice pain; however, this sequence of events may occur in any order. Typically, you will be able to move your limb before you will feel it. Once a nerve block begins to wear off, the effects are usually completely gone within 60 minutes.  If you experience continued side effects that you believe are block related for longer than 48 hours, please call your healthcare provider. Please see block-specific instructions below.    Instructions for any block involving the shoulder or arm  If you have had any kind of shoulder/arm block, you will go home with your arm in a sling. Wear the sling until the block has completely worn off. You may be required to wear it for a longer period of time per your surgeon’s recommendations.  If you have had a shoulder/arm block, it is a good idea to sleep on a recliner with pillows under your arm.    You may experience symptoms such as:  Shortness of breath  Hoarseness   Blurry vision  Unequal pupils  Drooping of your face on the same side as the block was performed    These are side effects associated with this kind of block and should go away within 12 hours.    Note: If you have severe or prolonged shortness of breath, please seek medical assistance as soon as possible.     Protection of a “blocked” arm or leg (limb)  After a nerve block, you cannot feel pain, pressure, or extremes of temperature in the affected limb. And because of this, your blocked limb is at more risk for injury. For example, it is possible to burn your limb on an extremely hot surface without feeling it.     When resting, it is important to reposition your limb periodically to avoid prolonged pressure on it. This may require the use of pillows and padding.    While sleeping, you  should avoid rolling onto the affected limb or putting too much pressure on it.     If you have a cast or tight dressing, check the color of your fingers or toes of the affected limb. Call your surgeon if they look discolored (that is, dusky, dark colored).    Use caution in cold weather. Cover your limb appropriately to protect it from the cold.      Pain Management:    Your surgeon will give you a prescription for pain medication. Begin taking this before the nerve block wears off. Bear in mind that sometimes the block can wear off in the middle of the night.

## 2024-10-07 ENCOUNTER — TELEPHONE (OUTPATIENT)
Dept: FAMILY MEDICINE CLINIC | Facility: CLINIC | Age: 79
End: 2024-10-07

## 2024-10-07 NOTE — TELEPHONE ENCOUNTER
Caller: Brian Martines Jr.    Relationship to patient: Self    Best call back number: [phone]    Patient is needing:     STATES THAT A COUPLE YEARS AGO HE HAD A PULMONARY TEST DONE.     HE WOULD LIKE TO KNOW WHOM THAT WAS WITH AND WHERE IT WAS DONE AT.     PLEASE CALL TO DISCUSS.

## 2025-08-18 ENCOUNTER — READMISSION MANAGEMENT (OUTPATIENT)
Dept: CALL CENTER | Facility: HOSPITAL | Age: 80
End: 2025-08-18
Payer: MEDICARE

## 2025-08-19 ENCOUNTER — TRANSITIONAL CARE MANAGEMENT TELEPHONE ENCOUNTER (OUTPATIENT)
Dept: CALL CENTER | Facility: HOSPITAL | Age: 80
End: 2025-08-19
Payer: MEDICARE

## 2025-08-21 ENCOUNTER — TELEPHONE (OUTPATIENT)
Dept: FAMILY MEDICINE CLINIC | Facility: CLINIC | Age: 80
End: 2025-08-21
Payer: MEDICARE

## 2025-08-22 ENCOUNTER — OFFICE VISIT (OUTPATIENT)
Dept: FAMILY MEDICINE CLINIC | Facility: CLINIC | Age: 80
End: 2025-08-22
Payer: MEDICARE

## 2025-08-22 VITALS
OXYGEN SATURATION: 97 % | HEIGHT: 71 IN | DIASTOLIC BLOOD PRESSURE: 78 MMHG | WEIGHT: 234 LBS | SYSTOLIC BLOOD PRESSURE: 130 MMHG | BODY MASS INDEX: 32.76 KG/M2 | RESPIRATION RATE: 16 BRPM | HEART RATE: 74 BPM

## 2025-08-22 DIAGNOSIS — I48.0 PAROXYSMAL ATRIAL FIBRILLATION: Primary | ICD-10-CM

## 2025-08-22 DIAGNOSIS — I10 PRIMARY HYPERTENSION: ICD-10-CM

## 2025-08-22 RX ORDER — METOPROLOL TARTRATE 25 MG/1
25 TABLET, FILM COATED ORAL 2 TIMES DAILY
COMMUNITY
Start: 2025-08-18

## 2025-08-22 RX ORDER — AMLODIPINE BESYLATE 10 MG/1
5 TABLET ORAL DAILY
Status: SHIPPED
Start: 2025-08-22

## (undated) DEVICE — DRSNG GZ PETROLTM XEROFORM CURAD 1X8IN STRL

## (undated) DEVICE — SUT ETHLN 3/0 PS1 18IN 1663H

## (undated) DEVICE — KT POSTN ARM TRIMANO BEACH CHR W/DRP

## (undated) DEVICE — TBG ARTHSCP PUMP W CONN/REDUC 8FT

## (undated) DEVICE — TBG ARTHSCP PT W CONN/REDUC 8FT

## (undated) DEVICE — BLANKT WARM LOWR/BDY 100X120CM

## (undated) DEVICE — TRY SKINPREP SCRB CHG

## (undated) DEVICE — DRAPE,SHOULDER,BEACH ULTRAGARD: Brand: MEDLINE

## (undated) DEVICE — BLD SHAVER BONECUTTER 4MM 13CM

## (undated) DEVICE — BUR SHAVER FLUSHCUT 8FLUT 5MM 13CM

## (undated) DEVICE — GLV SURG BIOGEL LTX PF 8

## (undated) DEVICE — CANN TRPL DAM 7X7MM

## (undated) DEVICE — SKIN PREP TRAY W/CHG: Brand: MEDLINE INDUSTRIES, INC.

## (undated) DEVICE — GLV SURG BIOGEL LTX PF 8 1/2

## (undated) DEVICE — Device

## (undated) DEVICE — GLV SURG BIOGEL LTX PF 7

## (undated) DEVICE — ABL ASP APOLLO RF XL 90D

## (undated) DEVICE — ADAPT DB SPIKE 2PCT FOR AR6400 TBG

## (undated) DEVICE — CANN TRPL DAM 7X7MM NO VLV

## (undated) DEVICE — PK ARTHSCP SHLDR TOWER 40

## (undated) DEVICE — GLV SURG BIOGEL LTX PF 6 1/2